# Patient Record
Sex: FEMALE | Race: WHITE | HISPANIC OR LATINO | Employment: OTHER | URBAN - METROPOLITAN AREA
[De-identification: names, ages, dates, MRNs, and addresses within clinical notes are randomized per-mention and may not be internally consistent; named-entity substitution may affect disease eponyms.]

---

## 2018-11-14 ENCOUNTER — TELEPHONE (OUTPATIENT)
Dept: CARDIOLOGY CLINIC | Facility: CLINIC | Age: 53
End: 2018-11-14

## 2018-11-14 NOTE — TELEPHONE ENCOUNTER
Called patient received referral via fax from Dr Rafy Taylor to Dr Loyda Garnica  Called patient home# and not working also called second # and no answer  Pt needs an appt with Dr Loyda Garnica pt id coming in for Chest pain, Acute Dermatitis

## 2018-11-16 ENCOUNTER — OFFICE VISIT (OUTPATIENT)
Dept: CARDIOLOGY CLINIC | Facility: CLINIC | Age: 53
End: 2018-11-16
Payer: COMMERCIAL

## 2018-11-16 VITALS
BODY MASS INDEX: 24.92 KG/M2 | DIASTOLIC BLOOD PRESSURE: 68 MMHG | SYSTOLIC BLOOD PRESSURE: 122 MMHG | OXYGEN SATURATION: 99 % | HEART RATE: 80 BPM | HEIGHT: 61 IN | WEIGHT: 132 LBS

## 2018-11-16 DIAGNOSIS — R07.9 CHEST PAIN, UNSPECIFIED TYPE: Primary | ICD-10-CM

## 2018-11-16 PROCEDURE — 93000 ELECTROCARDIOGRAM COMPLETE: CPT | Performed by: INTERNAL MEDICINE

## 2018-11-16 PROCEDURE — 99243 OFF/OP CNSLTJ NEW/EST LOW 30: CPT | Performed by: INTERNAL MEDICINE

## 2018-11-16 RX ORDER — GLUCOSAMINE/D3/BOSWELLIA SERRA 1500MG-400
TABLET ORAL
COMMUNITY

## 2018-11-16 RX ORDER — IBUPROFEN 800 MG/1
TABLET ORAL 3 TIMES DAILY
COMMUNITY
End: 2019-04-23 | Stop reason: ALTCHOICE

## 2018-11-16 RX ORDER — ALBUTEROL SULFATE 90 UG/1
AEROSOL, METERED RESPIRATORY (INHALATION) EVERY 6 HOURS PRN
COMMUNITY

## 2018-11-16 RX ORDER — MOMETASONE FUROATE 1 MG/G
OINTMENT TOPICAL
COMMUNITY
End: 2019-04-23 | Stop reason: ALTCHOICE

## 2018-11-16 RX ORDER — LANOLIN ALCOHOL/MO/W.PET/CERES
CREAM (GRAM) TOPICAL
COMMUNITY
End: 2021-08-13 | Stop reason: ALTCHOICE

## 2018-11-16 RX ORDER — FLUTICASONE PROPIONATE 50 MCG
SPRAY, SUSPENSION (ML) NASAL
COMMUNITY
Start: 2018-09-12 | End: 2019-08-20 | Stop reason: ALTCHOICE

## 2018-11-16 NOTE — PROGRESS NOTES
CARDIOLOGY OFFICE VISIT  [facility]  [address], [address] [place] [address], [facility] Abeba Hutchison  Tel: [phone]      NAME: Shiela Ganser  AGE: 46 y o  SEX: female  : 1965   MRN: [id number]      Chief Complaint:  Chief Complaint   Patient presents with    hospitals Care         History of Present Illness:   Ref by Dr Jose Thompson  60-year-old female with no significant past history who has been referred with a chief complaint of chest pain  Pain is in left lower chest, sharp in character  Lasts for only a few seconds  Worse with deep breathing and chest movement including lying down  No radiation  Not related to exertion  No associated nausea, diaphoresis, palpitations  Patient wants to exercise and is worried if she can because the chest pain  Pt denies SOB, palpitations, lightheadedness, syncope, swelling feet, orthopnea, PND, claudication  patient denies HTN, HLP, DM, smoking history   patient denies family history of premature CAD    Past Medical History:  History reviewed  No pertinent past medical history  Family History:   no history of premature CAD      Social History:  Social History     Social History    Marital status:       Spouse name: N/A    Number of children: N/A    Years of education: N/A     Social History Main Topics    Smoking status: Never Smoker    Smokeless tobacco: Never Used    Alcohol use No    Drug use: No    Sexual activity: Not Asked     Other Topics Concern    None     Social History Narrative    None       The following portions of the patient's history were reviewed and updated as appropriate: past medical history, past surgical history, past family history,  past social history, current medications, allergies list       Review of Systems:  Constitutional: Denies fever, chills, fatigue  Eyes: Denies eye redness, eye discharge, double vision  ENT: Denies hearing loss, tinnitus, sneezing, nasal discharge, sore throat   Respiratory: Denies cough, expectoration, hemoptysis, shortness of breath  Cardiovascular: Denies palpitations, orthopnea, PND, lower extremity swelling  Gastrointestinal: Denies abdominal pain, nausea, vomiting, hematemesis, diarrhea, bloody stools  Genito-Urinary: Denies dysuria, incontinence  Musculoskeletal: Denies back pain, joint pain, muscle pain  Neurologic: Denies confusion, lightheadedness, syncope, headache, focal weakness, sensory changes, seizures  Endocrine: Denies polyuria, polydipsia, temperature intolerance  Allergy and Immunology: Denies hives, insect bite sensitivity  Hematological and Lymphatic: Denies bleeding problems, swollen glands   Psychological: Denies depression, suicidal ideation, anxiety, panic  Dermatological: Denies pruritus, rash, skin lesion changes      Vitals:  Vitals:    11/16/18 1412   BP: 122/68   Pulse: 80   SpO2: 99%       Body mass index is 24 94 kg/m²  Weight (last 2 days)     Date/Time   Weight    11/16/18 1412  59 9 (132)                Physical Examination:  General: Patient is not in acute distress  Awake, alert, oriented in time, place and person  Responding to commands  Head: Normocephalic  Atraumatic  Eyes: Both pupils normal sized, round and reactive to light  Nonicteric  ENT: Normal external ear canals  Nares normal, no drainage  Lips and oral mucosa normal  Neck: Supple  JVP not raised  Trachea central  No thyromegaly  Lungs: Bilateral bronchovascular breath sounds with no crackles or rhonchi  Chest wall: No tenderness  Cardiovascular: RRR  S1 and S2 normal  No murmur, rub or gallop  Gastrointestinal: Abdomen soft, nontender  No guarding or rigidity  Liver and spleen not palpable  Bowel sounds present  Neurologic: Patient is awake, alert, oriented in time, place and person  Responding to command  Moving all extremities  Integumentary:  No skin rash  Lymphatic: No cervical lymphadenopathy  Back: Symmetric   No CVA tenderness  Extremities: No clubbing, cyanosis or edema      Laboratory Results:  EKG:  Sinus rhythm      Medications:    Current Outpatient Prescriptions:     albuterol (PROVENTIL HFA,VENTOLIN HFA) 90 mcg/act inhaler, ProAir HFA 90 mcg/actuation aerosol inhaler  Inhale 2 puffs every 4 hours by inhalation route for 30 days  , Disp: , Rfl:     Biotin 1 MG CAPS, biotin, Disp: , Rfl:     cyanocobalamin (VITAMIN B-12) 100 mcg tablet, Vitamin B12, Disp: , Rfl:     fluticasone (FLONASE) 50 mcg/act nasal spray, SPRAY 1 SPRAY INTO EACH NOSTRIL EVERY DAY, Disp: , Rfl:     ibuprofen (MOTRIN) 800 mg tablet, 3 (three) times a day, Disp: , Rfl:     mometasone (ELOCON) 0 1 % ointment, APPLY A THIN LAYER TO THE AFFECTED AREA(S) BY TOPICAL ROUTE on the Neck twice DAILY, Disp: , Rfl:       Allergies:  No Known Allergies      Assessment and Plan:  1  Chest pain, unspecified type   chest pain highly atypical to be cardiac in origin  However patient is asking if she can exercise and to what extent  Regular EKG stress test is ordered to answer the question    Recommend aggressive risk factor modification and therapeutic lifestyle changes  Low-salt, low-calorie, low-fat, low-cholesterol diet  I will defer the ordering and monitoring of necessity lab studies to you, but I am available and happy to review and manage any of the data at your request in the future  Discussed concepts of atherosclerosis, including signs and symptoms of cardiac disease  Previous studies were reviewed  Safety measures were reviewed  Questions were entertained and answered  Patient was advised to report any problems requiring medical attention  Follow-up with PCP and appropriate specialist and lab work as discussed  Return for follow up visit as scheduled or earlier, if needed  Thank you for allowing me to participate in the care and evaluation of your patient    Should you have any questions, please feel free to contact me       Castillo Stone MD  11/16/2018,5:29 PM

## 2018-11-20 ENCOUNTER — HOSPITAL ENCOUNTER (OUTPATIENT)
Dept: NON INVASIVE DIAGNOSTICS | Facility: CLINIC | Age: 53
Discharge: HOME | End: 2018-11-20
Payer: COMMERCIAL

## 2018-11-20 DIAGNOSIS — R07.9 CHEST PAIN, UNSPECIFIED TYPE: ICD-10-CM

## 2018-11-20 LAB
ARRHY DURING EX: NORMAL
CHEST PAIN STATEMENT: NORMAL
MAX DIASTOLIC BP: 60 MMHG
MAX HEART RATE: 157 BPM
MAX PREDICTED HEART RATE: 166 BPM
MAX. SYSTOLIC BP: 152 MMHG
PROTOCOL NAME: NORMAL
REASON FOR TERMINATION: NORMAL
TARGET HR FORMULA: NORMAL
TEST INDICATION: NORMAL
TIME IN EXERCISE PHASE: NORMAL

## 2018-11-20 PROCEDURE — 93017 CV STRESS TEST TRACING ONLY: CPT

## 2018-11-21 PROCEDURE — 93016 CV STRESS TEST SUPVJ ONLY: CPT | Performed by: INTERNAL MEDICINE

## 2018-11-21 PROCEDURE — 93018 CV STRESS TEST I&R ONLY: CPT | Performed by: INTERNAL MEDICINE

## 2019-03-18 ENCOUNTER — TELEPHONE (OUTPATIENT)
Dept: NEUROLOGY | Facility: CLINIC | Age: 54
End: 2019-03-18

## 2019-04-23 ENCOUNTER — CONSULT (OUTPATIENT)
Dept: NEUROLOGY | Facility: CLINIC | Age: 54
End: 2019-04-23
Payer: COMMERCIAL

## 2019-04-23 VITALS
SYSTOLIC BLOOD PRESSURE: 104 MMHG | HEIGHT: 61 IN | DIASTOLIC BLOOD PRESSURE: 66 MMHG | WEIGHT: 137 LBS | BODY MASS INDEX: 25.86 KG/M2 | HEART RATE: 70 BPM

## 2019-04-23 DIAGNOSIS — G43.711 INTRACTABLE CHRONIC MIGRAINE WITHOUT AURA AND WITH STATUS MIGRAINOSUS: Primary | ICD-10-CM

## 2019-04-23 DIAGNOSIS — J01.30 SUBACUTE SPHENOIDAL SINUSITIS: ICD-10-CM

## 2019-04-23 PROCEDURE — 99244 OFF/OP CNSLTJ NEW/EST MOD 40: CPT | Performed by: PSYCHIATRY & NEUROLOGY

## 2019-04-23 RX ORDER — TOPIRAMATE 25 MG/1
25 TABLET ORAL
Qty: 30 TABLET | Refills: 2 | Status: SHIPPED | OUTPATIENT
Start: 2019-04-23 | End: 2019-07-26 | Stop reason: SDUPTHER

## 2019-04-23 RX ORDER — OMEPRAZOLE MAGNESIUM 20 MG
3 CAPSULE,DELAYED RELEASE (ENTERIC COATED) ORAL
Refills: 4 | COMMUNITY
Start: 2019-03-18 | End: 2019-08-20 | Stop reason: ALTCHOICE

## 2019-04-23 RX ORDER — MULTIVIT WITH MINERALS/LUTEIN
1000 TABLET ORAL DAILY
COMMUNITY

## 2019-04-23 RX ORDER — SUMATRIPTAN 100 MG/1
100 TABLET, FILM COATED ORAL ONCE AS NEEDED
Qty: 9 TABLET | Refills: 2 | Status: SHIPPED | OUTPATIENT
Start: 2019-04-23 | End: 2019-07-02 | Stop reason: SDUPTHER

## 2019-06-07 ENCOUNTER — HOSPITAL ENCOUNTER (OUTPATIENT)
Dept: MRI IMAGING | Facility: CLINIC | Age: 54
Discharge: HOME | End: 2019-06-07
Payer: COMMERCIAL

## 2019-06-07 DIAGNOSIS — J01.30 SUBACUTE SPHENOIDAL SINUSITIS: ICD-10-CM

## 2019-06-07 DIAGNOSIS — G43.711 INTRACTABLE CHRONIC MIGRAINE WITHOUT AURA AND WITH STATUS MIGRAINOSUS: ICD-10-CM

## 2019-06-07 PROCEDURE — 70551 MRI BRAIN STEM W/O DYE: CPT

## 2019-07-02 DIAGNOSIS — G43.711 INTRACTABLE CHRONIC MIGRAINE WITHOUT AURA AND WITH STATUS MIGRAINOSUS: ICD-10-CM

## 2019-07-02 RX ORDER — SUMATRIPTAN 100 MG/1
100 TABLET, FILM COATED ORAL ONCE AS NEEDED
Qty: 9 TABLET | Refills: 2 | Status: SHIPPED | OUTPATIENT
Start: 2019-07-02 | End: 2019-08-20 | Stop reason: SDUPTHER

## 2019-07-02 NOTE — TELEPHONE ENCOUNTER
Pt calling in regarding MRI results  Made pt aware that impression  Pt asking why she is still getting migraines  Pt in need for sumatriptan refill pending for you approval     Pt asking for a call from you      [phone]

## 2019-07-05 ENCOUNTER — TELEPHONE (OUTPATIENT)
Dept: NEUROLOGY | Facility: CLINIC | Age: 54
End: 2019-07-05

## 2019-07-26 DIAGNOSIS — G43.711 INTRACTABLE CHRONIC MIGRAINE WITHOUT AURA AND WITH STATUS MIGRAINOSUS: ICD-10-CM

## 2019-07-26 RX ORDER — TOPIRAMATE 25 MG/1
25 TABLET ORAL
Qty: 30 TABLET | Refills: 2 | Status: SHIPPED | OUTPATIENT
Start: 2019-07-26 | End: 2019-08-20 | Stop reason: SDUPTHER

## 2019-08-20 ENCOUNTER — OFFICE VISIT (OUTPATIENT)
Dept: NEUROLOGY | Facility: CLINIC | Age: 54
End: 2019-08-20
Payer: COMMERCIAL

## 2019-08-20 VITALS
SYSTOLIC BLOOD PRESSURE: 100 MMHG | DIASTOLIC BLOOD PRESSURE: 70 MMHG | WEIGHT: 135 LBS | HEIGHT: 61 IN | HEART RATE: 72 BPM | BODY MASS INDEX: 25.49 KG/M2

## 2019-08-20 DIAGNOSIS — G43.711 INTRACTABLE CHRONIC MIGRAINE WITHOUT AURA AND WITH STATUS MIGRAINOSUS: ICD-10-CM

## 2019-08-20 PROCEDURE — 99213 OFFICE O/P EST LOW 20 MIN: CPT | Performed by: PSYCHIATRY & NEUROLOGY

## 2019-08-20 RX ORDER — SUMATRIPTAN 100 MG/1
100 TABLET, FILM COATED ORAL ONCE AS NEEDED
Qty: 9 TABLET | Refills: 4 | Status: SHIPPED | OUTPATIENT
Start: 2019-08-20 | End: 2020-02-20 | Stop reason: SDUPTHER

## 2019-08-20 RX ORDER — TOPIRAMATE 25 MG/1
25 TABLET ORAL
Qty: 30 TABLET | Refills: 4 | Status: SHIPPED | OUTPATIENT
Start: 2019-08-20 | End: 2020-02-20 | Stop reason: SDUPTHER

## 2019-08-20 NOTE — PROGRESS NOTES
Progress Note - Neurology   Abeba Palomino 53 y o  female MRN: [id number]  Unit/Bed#:  Encounter: [id number]      Subjective:   Patient is here for a follow-up visit with a history of chronic migraine headaches, and since her last visit had an MRI of the brain which was unremarkable except for thickening of the mucosal sinuses and she saw relief with the help of topiramate and sumatriptan but decided to discontinue the topiramate since she was doing well and unfortunately started getting migraines again  For the last 2 weeks she has been on medications in the form of topiramate 25 mg at bedtime and sumatriptan on a p r n  Basis which also gives her prompt relief  Patient denies any other neurological symptoms  Her MRI results were discussed with her at length  ROS:   Review of Systems   Constitutional: Negative  Negative for appetite change and fever  HENT: Negative  Negative for hearing loss, tinnitus, trouble swallowing and voice change  Eyes: Positive for photophobia  Negative for pain  Respiratory: Negative  Negative for shortness of breath  Cardiovascular: Negative  Negative for palpitations  Gastrointestinal: Negative  Negative for abdominal pain, nausea and vomiting  Endocrine: Negative  Negative for cold intolerance and heat intolerance  Genitourinary: Negative  Negative for dysuria, frequency and urgency  Musculoskeletal: Positive for back pain and neck pain  Negative for myalgias  Muscle cramping of left lower leg and foot   Skin: Negative  Negative for rash  Neurological: Positive for headaches  Negative for dizziness, tremors, seizures, syncope, facial asymmetry, speech difficulty, weakness, light-headedness and numbness  Hematological: Negative  Does not bruise/bleed easily  Psychiatric/Behavioral: Positive for sleep disturbance  Negative for confusion and hallucinations         Vitals:   Vitals:    08/20/19 1121   BP: 100/70   BP Location: Left arm   Patient Position: Sitting   Cuff Size: Adult   Pulse: 72   Weight: 61 2 kg (135 lb)   Height: 5' 1" (1 549 m)   ,Body mass index is 25 51 kg/m²  MEDS:      Current Outpatient Medications:     albuterol (PROVENTIL HFA,VENTOLIN HFA) 90 mcg/act inhaler, every 6 (six) hours as needed , Disp: , Rfl:     Ascorbic Acid (VITAMIN C) 1000 MG tablet, Take 1,000 mg by mouth daily, Disp: , Rfl:     Biotin 90046 MCG TABS, biotin, Disp: , Rfl:     COLLAGEN PO, Take 10,000 mg by mouth, Disp: , Rfl:     cyanocobalamin (VITAMIN B-12) 1,000 mcg tablet, Vitamin B12, Disp: , Rfl:     SUMAtriptan (IMITREX) 100 mg tablet, Take 1 tablet (100 mg total) by mouth once as needed for migraine for up to 1 dose Max 3 doses per week, Disp: 9 tablet, Rfl: 2    topiramate (TOPAMAX) 25 mg tablet, Take 1 tablet (25 mg total) by mouth daily at bedtime, Disp: 30 tablet, Rfl: 2  :    Physical Exam:  General appearance: alert, appears stated age and cooperative  Head: Normocephalic, without obvious abnormality, atraumatic      On examination she has no evidence of any cranial nerve, motor or sensory deficits in the upper or lower extremities, no evidence of any dysmetria, her gait is normal based    Lab Results: I have personally reviewed pertinent reports  Imaging Studies: I have personally reviewed pertinent reports  Assessment:  1  Chronic migraine headaches  Plan:  Patient was familiarised with migraine triggers again, she is encouraged to continue present medications, denies any side effects from the medications and will return back to see me in 6 months       8/20/2019,11:33 AM    Dictation voice to text software has been used in the creation of this document  Please consider this in light of any contextual or grammatical errors

## 2020-02-20 ENCOUNTER — OFFICE VISIT (OUTPATIENT)
Dept: NEUROLOGY | Facility: CLINIC | Age: 55
End: 2020-02-20
Payer: COMMERCIAL

## 2020-02-20 VITALS
BODY MASS INDEX: 24.55 KG/M2 | SYSTOLIC BLOOD PRESSURE: 94 MMHG | WEIGHT: 130 LBS | HEART RATE: 75 BPM | DIASTOLIC BLOOD PRESSURE: 68 MMHG | HEIGHT: 61 IN

## 2020-02-20 DIAGNOSIS — G43.711 INTRACTABLE CHRONIC MIGRAINE WITHOUT AURA AND WITH STATUS MIGRAINOSUS: ICD-10-CM

## 2020-02-20 DIAGNOSIS — G43.709 CHRONIC MIGRAINE WITHOUT AURA WITHOUT STATUS MIGRAINOSUS, NOT INTRACTABLE: Primary | ICD-10-CM

## 2020-02-20 PROCEDURE — 99213 OFFICE O/P EST LOW 20 MIN: CPT | Performed by: PSYCHIATRY & NEUROLOGY

## 2020-02-20 RX ORDER — FERROUS SULFATE 325(65) MG
975 TABLET ORAL EVERY OTHER DAY
COMMUNITY
End: 2021-08-13 | Stop reason: ALTCHOICE

## 2020-02-20 RX ORDER — TOPIRAMATE 25 MG/1
25 TABLET ORAL
Qty: 30 TABLET | Refills: 6 | Status: SHIPPED | OUTPATIENT
Start: 2020-02-20 | End: 2020-12-18

## 2020-02-20 RX ORDER — SUMATRIPTAN 100 MG/1
100 TABLET, FILM COATED ORAL ONCE AS NEEDED
Qty: 9 TABLET | Refills: 6 | Status: SHIPPED | OUTPATIENT
Start: 2020-02-20 | End: 2020-07-21

## 2020-02-20 NOTE — PROGRESS NOTES
Progress Note - Neurology   Gil Satnos 53 y o  female MRN: [id number]  Unit/Bed#:  Encounter: [id number]      Subjective:   Patient is here for a follow-up visit with a history of chronic migraine headaches, and since her last visit she continues to experience occasional headaches which can last for 3 days in a row generally relieved with sumatriptan  She remains on topiramate 25 milligrams at bedtime which has helped significantly and denies any side effects  Overall has been feeling much better  ROS:   Review of Systems   Constitutional: Negative  Negative for appetite change and fever  HENT: Negative  Negative for hearing loss, tinnitus, trouble swallowing and voice change  Eyes: Positive for pain  Negative for photophobia  Respiratory: Negative  Negative for shortness of breath  Cardiovascular: Negative  Negative for palpitations  Gastrointestinal: Negative  Negative for nausea and vomiting  Endocrine: Negative  Negative for cold intolerance and heat intolerance  Genitourinary: Negative  Negative for dysuria, frequency and urgency  Musculoskeletal: Negative  Negative for myalgias and neck pain  Skin: Negative  Negative for rash  Neurological: Positive for headaches  Negative for dizziness, tremors, seizures, syncope, facial asymmetry, speech difficulty, weakness, light-headedness and numbness  Hematological: Negative  Does not bruise/bleed easily  Psychiatric/Behavioral: Positive for sleep disturbance  Negative for confusion and hallucinations  Vitals:   Vitals:    02/20/20 0953   BP: 94/68   BP Location: Left arm   Patient Position: Sitting   Cuff Size: Adult   Pulse: 75   Weight: 59 kg (130 lb)   Height: 5' 1" (1 549 m)   ,Body mass index is 24 56 kg/m²      MEDS:      Current Outpatient Medications:     albuterol (PROVENTIL HFA,VENTOLIN HFA) 90 mcg/act inhaler, every 6 (six) hours as needed , Disp: , Rfl:     Ascorbic Acid (VITAMIN C) 1000 MG tablet, Take 1,000 mg by mouth daily, Disp: , Rfl:     Biotin 62864 MCG TABS, biotin, Disp: , Rfl:     Calcium Carbonate-Vitamin D (Calcium 500 + D) 500-125 MG-UNIT TABS, Take by mouth, Disp: , Rfl:     COLLAGEN PO, Take 10,000 mg by mouth, Disp: , Rfl:     cyanocobalamin (VITAMIN B-12) 1,000 mcg tablet, Vitamin B12, Disp: , Rfl:     ferrous sulfate 325 (65 Fe) mg tablet, Take 975 mg by mouth every other day, Disp: , Rfl:     MAGNESIUM CITRATE PO, Take by mouth, Disp: , Rfl:     SUMAtriptan (IMITREX) 100 mg tablet, Take 1 tablet (100 mg total) by mouth once as needed for migraine for up to 1 dose Max 3 doses per week, Disp: 9 tablet, Rfl: 4    topiramate (TOPAMAX) 25 mg tablet, Take 1 tablet (25 mg total) by mouth daily at bedtime, Disp: 30 tablet, Rfl: 4  :    Physical Exam:  General appearance: alert, appears stated age and cooperative  Head: Normocephalic, without obvious abnormality, atraumatic    On examination patient is alert awake oriented, speech is fluent, cranial nerves 2-12 intact, no new deficits were noted on motor and sensory exam   No evidence of any dysmetria and her gait is normal based  Lab Results: I have personally reviewed pertinent reports  Imaging Studies: I have personally reviewed pertinent reports  Assessment:  1  Chronic migraine headaches  Plan:  Patient is advised to continue topiramate 25 milligrams at bedtime, sumatriptan 100 milligrams on a p r n  Basis, to be taken at the onset of the headache, and will return back to see me in 6 months       2/20/2020,9:59 AM    Dictation voice to text software has been used in the creation of this document  Please consider this in light of any contextual or grammatical errors

## 2020-07-20 DIAGNOSIS — G43.711 INTRACTABLE CHRONIC MIGRAINE WITHOUT AURA AND WITH STATUS MIGRAINOSUS: ICD-10-CM

## 2020-07-21 RX ORDER — SUMATRIPTAN 100 MG/1
100 TABLET, FILM COATED ORAL ONCE AS NEEDED
Qty: 9 TABLET | Refills: 2 | Status: SHIPPED | OUTPATIENT
Start: 2020-07-21 | End: 2021-04-01 | Stop reason: SDUPTHER

## 2020-08-21 ENCOUNTER — TRANSCRIBE ORDERS (OUTPATIENT)
Dept: ADMINISTRATIVE | Facility: HOSPITAL | Age: 55
End: 2020-08-21

## 2020-08-21 DIAGNOSIS — R79.89 HYPOURICEMIA: Primary | ICD-10-CM

## 2020-08-27 ENCOUNTER — HOSPITAL ENCOUNTER (OUTPATIENT)
Dept: ULTRASOUND IMAGING | Facility: CLINIC | Age: 55
Discharge: HOME | End: 2020-08-27
Payer: COMMERCIAL

## 2020-08-27 DIAGNOSIS — R79.89 HYPOURICEMIA: ICD-10-CM

## 2020-08-27 PROCEDURE — 76536 US EXAM OF HEAD AND NECK: CPT

## 2020-12-18 DIAGNOSIS — G43.711 INTRACTABLE CHRONIC MIGRAINE WITHOUT AURA AND WITH STATUS MIGRAINOSUS: ICD-10-CM

## 2020-12-18 RX ORDER — TOPIRAMATE 25 MG/1
TABLET ORAL
Qty: 30 TABLET | Refills: 6 | Status: SHIPPED | OUTPATIENT
Start: 2020-12-18 | End: 2021-07-28

## 2021-04-01 DIAGNOSIS — G43.711 INTRACTABLE CHRONIC MIGRAINE WITHOUT AURA AND WITH STATUS MIGRAINOSUS: ICD-10-CM

## 2021-04-01 RX ORDER — SUMATRIPTAN 100 MG/1
100 TABLET, FILM COATED ORAL ONCE AS NEEDED
Qty: 9 TABLET | Refills: 2 | Status: SHIPPED | OUTPATIENT
Start: 2021-04-01 | End: 2021-07-06

## 2021-07-03 DIAGNOSIS — G43.711 INTRACTABLE CHRONIC MIGRAINE WITHOUT AURA AND WITH STATUS MIGRAINOSUS: ICD-10-CM

## 2021-07-06 RX ORDER — SUMATRIPTAN 100 MG/1
TABLET, FILM COATED ORAL
Qty: 9 TABLET | Refills: 2 | Status: SHIPPED | OUTPATIENT
Start: 2021-07-06 | End: 2021-08-13 | Stop reason: SDUPTHER

## 2021-07-28 DIAGNOSIS — G43.711 INTRACTABLE CHRONIC MIGRAINE WITHOUT AURA AND WITH STATUS MIGRAINOSUS: ICD-10-CM

## 2021-07-28 RX ORDER — TOPIRAMATE 25 MG/1
TABLET ORAL
Qty: 30 TABLET | Refills: 6 | Status: SHIPPED | OUTPATIENT
Start: 2021-07-28 | End: 2021-08-13 | Stop reason: SDUPTHER

## 2021-08-13 ENCOUNTER — OFFICE VISIT (OUTPATIENT)
Dept: NEUROLOGY | Facility: CLINIC | Age: 56
End: 2021-08-13
Payer: COMMERCIAL

## 2021-08-13 VITALS
HEART RATE: 72 BPM | HEIGHT: 61 IN | DIASTOLIC BLOOD PRESSURE: 72 MMHG | TEMPERATURE: 98.2 F | BODY MASS INDEX: 25.68 KG/M2 | WEIGHT: 136 LBS | SYSTOLIC BLOOD PRESSURE: 98 MMHG | RESPIRATION RATE: 16 BRPM

## 2021-08-13 DIAGNOSIS — G43.711 INTRACTABLE CHRONIC MIGRAINE WITHOUT AURA AND WITH STATUS MIGRAINOSUS: ICD-10-CM

## 2021-08-13 PROCEDURE — 99214 OFFICE O/P EST MOD 30 MIN: CPT | Performed by: PSYCHIATRY & NEUROLOGY

## 2021-08-13 RX ORDER — TOPIRAMATE 25 MG/1
50 TABLET ORAL
Qty: 60 TABLET | Refills: 6 | Status: SHIPPED | OUTPATIENT
Start: 2021-08-13 | End: 2022-06-03

## 2021-08-13 RX ORDER — DIPHENOXYLATE HYDROCHLORIDE AND ATROPINE SULFATE 2.5; .025 MG/1; MG/1
1 TABLET ORAL DAILY
COMMUNITY

## 2021-08-13 RX ORDER — SUMATRIPTAN 100 MG/1
100 TABLET, FILM COATED ORAL ONCE AS NEEDED
Qty: 9 TABLET | Refills: 6 | Status: SHIPPED | OUTPATIENT
Start: 2021-08-13 | End: 2022-06-14

## 2021-08-13 NOTE — PROGRESS NOTES
Progress Note - Neurology   Alexsh[place] 61 y o  female MRN: [id number]  Unit/Bed#:  Encounter: [id number]      Subjective:   Patient is here for a follow-up visit and was last seen by me over a year ago for chronic migraine headaches  Patient remains on topiramate 25 mg at bedtime and sumatriptan on a p r n  basis and reports somewhat worsening frequency of headaches in the recent past which occur once or twice in a month and last for 3-4 days  She generally gets prompt relief with the help of sumatriptan if she takes it early but has to repeat the sumatriptan the next day  Patient does not recall any triggers that could be worsening her headaches and denies any other neurological symptoms associated with the same  She was recently worked up by Endocrinology for possible hypothyroidism  There has been no other significant changes in her health since she last saw me    ROS:   Review of Systems   Constitutional: Negative for appetite change, fatigue and fever  HENT: Negative  Negative for hearing loss, tinnitus, trouble swallowing and voice change  Eyes: Negative  Negative for photophobia and pain  Respiratory: Negative  Negative for shortness of breath  Cardiovascular: Negative  Negative for palpitations  Gastrointestinal: Negative  Negative for nausea and vomiting  Endocrine: Negative  Negative for cold intolerance  Genitourinary: Negative  Negative for dysuria, frequency and urgency  Musculoskeletal: Negative  Negative for myalgias and neck pain  Skin: Negative  Negative for rash  Neurological: Positive for headaches (1-2 a month, lasting 3-4 days at a time)  Negative for dizziness, tremors, seizures, syncope, facial asymmetry, speech difficulty, weakness, light-headedness and numbness  Hematological: Negative  Does not bruise/bleed easily  Psychiatric/Behavioral: Positive for sleep disturbance  Negative for confusion and hallucinations      MA review of systems was reviewed by myself  Vitals:   Vitals:    08/13/21 0917   BP: 98/72   BP Location: Left arm   Patient Position: Sitting   Cuff Size: Standard   Pulse: 72   Resp: 16   Temp: 98 2 °F (36 8 °C)   TempSrc: Temporal   Weight: 61 7 kg (136 lb)   Height: 5' 1" (1 549 m)   ,Body mass index is 25 7 kg/m²  MEDS:      Current Outpatient Medications:     albuterol (PROVENTIL HFA,VENTOLIN HFA) 90 mcg/act inhaler, every 6 (six) hours as needed , Disp: , Rfl:     Ascorbic Acid (VITAMIN C) 1000 MG tablet, Take 1,000 mg by mouth daily, Disp: , Rfl:     Biotin 77202 MCG TABS, biotin, Disp: , Rfl:     COLLAGEN PO, Take 10,000 mg by mouth, Disp: , Rfl:     MAGNESIUM CITRATE PO, Take by mouth, Disp: , Rfl:     multivitamin (THERAGRAN) TABS, Take 1 tablet by mouth daily, Disp: , Rfl:     SUMAtriptan (IMITREX) 100 mg tablet, TAKE 1 TABLET BY MOUTH ONCE AS NEEDED FOR MIGRAINE FOR UP TO 1 DOSE MAX 3 DOSES PER WEEK, Disp: 9 tablet, Rfl: 2    topiramate (TOPAMAX) 25 mg tablet, TAKE 1 TABLET BY MOUTH DAILY AT BEDTIME, Disp: 30 tablet, Rfl: 6  :    Physical Exam:  General appearance: alert, appears stated age and cooperative  Head: Normocephalic, without obvious abnormality, atraumatic      On neurological examination patient is alert awake oriented, speech is fluent, cranial nerves 2-12 intact, and on motor and sensory exam there is no evidence of any drift or focal weakness, no sensory loss was noted, no evidence of any dysmetria and her gait is normal based  Lab Results: I have personally reviewed pertinent reports  Imaging Studies: I have personally reviewed pertinent reports  Assessment:  1  Chronic migraine headaches with recent worsening  Plan:     Patient is advised at this time to increase the dose of topiramate to 50 mg at bedtime, continue sumatriptan on a p r n  basis, she was made familiar with dietary triggers and other migraine triggers, and patient will call us if she notices no improvement    She will return back to see me in 6 months  Counseling / Coordination of Care  Total time spent today 25 minutes  Greater than 50% of total time was spent with the patient and / or family counseling and / or coordination of care  8/13/2021,9:19 AM    Dictation voice to text software has been used in the creation of this document  Please consider this in light of any contextual or grammatical errors

## 2022-04-29 ENCOUNTER — TELEPHONE (OUTPATIENT)
Dept: NEUROLOGY | Facility: CLINIC | Age: 57
End: 2022-04-29

## 2022-05-09 NOTE — TELEPHONE ENCOUNTER
Called patient lmom for her to call back to confirm appointment for 9/6/22 at 230 pm, with Dr Jessica Briseno   Left call back number [phone]

## 2022-05-09 NOTE — TELEPHONE ENCOUNTER
Called patient and leave a message to contact office to reschedule appointment from 04/29/22 with Jay Puga

## 2022-06-02 DIAGNOSIS — G43.711 INTRACTABLE CHRONIC MIGRAINE WITHOUT AURA AND WITH STATUS MIGRAINOSUS: ICD-10-CM

## 2022-06-03 RX ORDER — TOPIRAMATE 25 MG/1
50 TABLET ORAL
Qty: 60 TABLET | Refills: 6 | Status: SHIPPED | OUTPATIENT
Start: 2022-06-03

## 2022-08-31 ENCOUNTER — TELEPHONE (OUTPATIENT)
Dept: NEUROLOGY | Facility: CLINIC | Age: 57
End: 2022-08-31

## 2022-09-06 ENCOUNTER — OFFICE VISIT (OUTPATIENT)
Dept: NEUROLOGY | Facility: CLINIC | Age: 57
End: 2022-09-06
Payer: COMMERCIAL

## 2022-09-06 VITALS
BODY MASS INDEX: 25.7 KG/M2 | TEMPERATURE: 98 F | SYSTOLIC BLOOD PRESSURE: 110 MMHG | OXYGEN SATURATION: 98 % | DIASTOLIC BLOOD PRESSURE: 68 MMHG | HEART RATE: 70 BPM | HEIGHT: 61 IN

## 2022-09-06 DIAGNOSIS — G43.719 INTRACTABLE CHRONIC MIGRAINE WITHOUT AURA AND WITHOUT STATUS MIGRAINOSUS: Primary | ICD-10-CM

## 2022-09-06 DIAGNOSIS — G43.711 INTRACTABLE CHRONIC MIGRAINE WITHOUT AURA AND WITH STATUS MIGRAINOSUS: ICD-10-CM

## 2022-09-06 PROCEDURE — 99215 OFFICE O/P EST HI 40 MIN: CPT | Performed by: PSYCHIATRY & NEUROLOGY

## 2022-09-06 RX ORDER — MAGNESIUM OXIDE 400 MG/1
400 TABLET ORAL DAILY
COMMUNITY

## 2022-09-06 RX ORDER — GABAPENTIN 100 MG/1
100 CAPSULE ORAL
Qty: 30 CAPSULE | Refills: 5 | Status: SHIPPED | OUTPATIENT
Start: 2022-09-06

## 2022-09-06 RX ORDER — SUMATRIPTAN 100 MG/1
100 TABLET, FILM COATED ORAL ONCE AS NEEDED
Qty: 9 TABLET | Refills: 3 | Status: SHIPPED | OUTPATIENT
Start: 2022-09-06

## 2022-09-06 NOTE — PROGRESS NOTES
Manjeet Stone is a 61 y o  female  Chief Complaint   Patient presents with    Intractable chronic migraine without aura and with status m       Assessment:  1  Intractable chronic migraine without aura and without status migrainosus        Plan:  Neurontin 100 mg at nighttime  Continue with Imitrex 100 mg p r n  Avoid migraine triggers  Follow-up in 6 months  Discussion:  Differential diagnosis discussed with the patient, patient with history of chronic migraine headaches, she has had an MRI scan of the brain in the past that was normal since she has stopped using the Topamax her headaches are slightly worse but patient did not find much relief with Topamax I discussed with her different prophylactic medications she is agreeable to go on gabapentin 100 mg at nighttime to see if it will help with the headaches side effects discussed with her if in case she is tolerating it well then we can always increase the dose, if she has any side effects then we can stop the medication, she was advised to avoid migraine triggers which we discussed in detail including foods to avoid  She was advised to keep herself well hydrated, to keep her blood pressure cholesterol and sugar under control  She will continue with Imitrex 100 mg p r n  once a day can repeat it after 2 hours if needed maximum twice a week, patient denies having any cardiac issues and has tolerated the medication well in the past, to go to the hospital if has any worsening symptoms and call me otherwise to see me back in 6 months and follow up with her  other physicians          Subjective:    HPI   Patient is here in follow-up for history of chronic headaches, she has seen my associate Dr Ángel Barajas in the past and since the last visit she feels that she has been getting more headaches at least twice a week they are mostly in the front of the head and then radiate to the top of the head going to the back associated with photophobia phonophobia about 8 on 10 sometimes is associated with nausea it could last for a couple of hours and sometimes more she was on Topamax but did not feel that it was helping her much with the headaches and hence she stopped using the Topamax, she has slight difficulty in going sleep, appetite is good weight has been stable no vision difficulties no other complaints  Vitals:    09/06/22 1414   BP: 110/68   BP Location: Right arm   Patient Position: Sitting   Cuff Size: Adult   Pulse: 70   Temp: 98 °F (36 7 °C)   TempSrc: Probe   SpO2: 98%   Height: 5' 1" (1 549 m)       Current Medications    Current Outpatient Medications:     albuterol (PROVENTIL HFA,VENTOLIN HFA) 90 mcg/act inhaler, every 6 (six) hours as needed , Disp: , Rfl:     Ascorbic Acid (VITAMIN C) 1000 MG tablet, Take 1,000 mg by mouth daily, Disp: , Rfl:     Biotin 87910 MCG TABS, biotin, Disp: , Rfl:     Cholecalciferol 25 MCG (1000 UT) tablet, Take 1,000 Units by mouth daily, Disp: , Rfl:     COLLAGEN PO, Take 10,000 mg by mouth, Disp: , Rfl:     MAGNESIUM CITRATE PO, Take by mouth, Disp: , Rfl:     magnesium oxide (MAG-OX) 400 mg tablet, Take 400 mg by mouth daily, Disp: , Rfl:     milk thistle 175 MG tablet, Take 175 mg by mouth daily, Disp: , Rfl:     SUMAtriptan (IMITREX) 100 mg tablet, Take 1 tablet (100 mg total) by mouth once as needed for migraine, Disp: 9 tablet, Rfl: 3    multivitamin (THERAGRAN) TABS, Take 1 tablet by mouth daily (Patient not taking: Reported on 9/6/2022), Disp: , Rfl:     topiramate (TOPAMAX) 25 mg tablet, TAKE 2 TABLETS (50 MG TOTAL) BY MOUTH DAILY AT BEDTIME (Patient not taking: Reported on 9/6/2022), Disp: 60 tablet, Rfl: 6      Allergies  Patient has no known allergies  Past Medical History  Past Medical History:   Diagnosis Date    Anemia     Head injury     about age 14 ?     Headache, tension-type          Past Surgical History:  Past Surgical History:   Procedure Laterality Date    NO PAST SURGERIES           Family History:  Family History   Problem Relation Age of Onset    No Known Problems Mother     Hypertension Father     Sudden death Father     Hypertension Brother     Hypertension Brother     Cancer Maternal Uncle        Social History:   reports that she has never smoked  She has never used smokeless tobacco  She reports that she does not drink alcohol and does not use drugs  I have reviewed the past medical history, surgical history, social and family history, current medications, allergies vitals, review of systems, and updated this information as appropriate today  Objective:    Physical Exam    Neurological Exam      GENERAL:  Cooperative in no acute distress  Well-developed and well-nourished    HEAD and NECK   Head is atraumatic normocephalic with no lesions or masses  Neck is supple with full range of motion    CARDIOVASCULAR  Carotid Arteries-no carotid bruits  NEUROLOGIC:  Mental Status-the patient is awake alert and oriented without aphasia or apraxia  Cranial Nerves: Visual fields are full to confrontation  Visual acuity is 20/20 with hand-held chart Extraocular movements are full without nystagmus  Pupils are 2-1/2 mm and reactive  Face is symmetrical to light touch  Movements of facial expression move symmetrically  Hearing is normal to finger rub bilaterally  Soft palate lifts symmetrically  Shoulder shrug is symmetrical  Tongue is midline without atrophy  Motor: No drift is noted on arm extension  Strength is full in the upper and lower extremities with normal bulk and tone  Sensory: Intact to temperature and vibratory sensation in the upper and lower extremities bilaterally  Cortical function is intact  Coordination: Finger to nose testing is performed accurately  Romberg is negative  Gait reveals a normal base with symmetrical arm swing  Tandem walk is normal   Reflexes:      2+ and symmetrical Toes are downgoing  No temporal artery tenderness no meningeal signs          ROS:  Review of Systems   Constitutional: Negative  Negative for appetite change and fever  HENT: Negative  Negative for hearing loss, tinnitus, trouble swallowing and voice change  Eyes: Negative  Negative for photophobia and pain  Respiratory: Negative  Negative for shortness of breath  Cardiovascular: Negative  Negative for palpitations  Gastrointestinal: Negative  Negative for nausea and vomiting  Endocrine: Negative  Negative for cold intolerance  Genitourinary: Negative  Negative for dysuria, frequency and urgency  Musculoskeletal: Negative  Negative for myalgias and neck pain  Skin: Negative  Negative for rash  Neurological: Positive for headaches  Negative for dizziness, tremors, seizures, syncope, facial asymmetry, speech difficulty, weakness, light-headedness and numbness  Hematological: Bruises/bleeds easily  Psychiatric/Behavioral: Positive for sleep disturbance  Negative for confusion and hallucinations

## 2022-10-26 ENCOUNTER — PREP FOR PROCEDURE (OUTPATIENT)
Dept: GASTROENTEROLOGY | Facility: CLINIC | Age: 57
End: 2022-10-26

## 2022-10-26 ENCOUNTER — TELEPHONE (OUTPATIENT)
Dept: GASTROENTEROLOGY | Facility: CLINIC | Age: 57
End: 2022-10-26

## 2022-10-26 DIAGNOSIS — Z12.11 SCREENING FOR COLON CANCER: Primary | ICD-10-CM

## 2022-10-26 NOTE — TELEPHONE ENCOUNTER
10/26/22  Screened by: Jey Traylor    Referring Provider Dr Tomasz Dorsey    Pre- Screening: There is no height or weight on file to calculate BMI  Has patient been referred for a routine screening Colonoscopy? yes  Is the patient between 39-70 years old? yes      Previous Colonoscopy no   If yes:    Date:     Facility:     Reason:       SCHEDULING STAFF: If the patient is between 45yrs-49yrs, please advise patient to confirm benefits/coverage with their insurance company for a routine screening colonoscopy, some insurance carriers will only cover at [address] or older  If the patient is over 66years old, please schedule an office visit  Does the patient want to see a Gastroenterologist prior to their procedure OR are they having any GI symptoms? no    Has the patient been hospitalized or had abdominal surgery in the past 6 months? no    Does the patient use supplemental oxygen? no    Does the patient take Coumadin, Lovenox, Plavix, Elliquis, Xarelto, or other blood thinning medication? no    Has the patient had a stroke, cardiac event, or stent placed in the past year? no    SCHEDULING STAFF: If patient answers NO to above questions, then schedule procedure  If patient answers YES to above questions, then schedule office appointment  If patient is between 45yrs - 49yrs, please advise patient that we will have to confirm benefits & coverage with their insurance company for a routine screening colonoscopy

## 2022-10-26 NOTE — TELEPHONE ENCOUNTER
Patients GI provider:  Getachew OA    Number to return call: ([phone])    Reason for call: Pt calling to schedule her colonoscopy in the [place] area  Thank you!     Scheduled procedure/appointment date if applicable: Apt/procedure

## 2022-10-26 NOTE — TELEPHONE ENCOUNTER
Scheduled date of colonoscopy (as of today): 1/4/23  Physician performing colonoscopy: Mitchell  Location of colonoscopy: [facility] reviewed with patient: [facility]- mailed  Instructions reviewed with patient by: Jeff SMITH  Clearances:

## 2023-01-04 ENCOUNTER — ANESTHESIA (OUTPATIENT)
Dept: GASTROENTEROLOGY | Facility: HOSPITAL | Age: 58
End: 2023-01-04

## 2023-01-04 ENCOUNTER — HOSPITAL ENCOUNTER (OUTPATIENT)
Dept: GASTROENTEROLOGY | Facility: HOSPITAL | Age: 58
Setting detail: OUTPATIENT SURGERY
Discharge: HOME | End: 2023-01-04
Attending: INTERNAL MEDICINE | Admitting: INTERNAL MEDICINE

## 2023-01-04 ENCOUNTER — ANESTHESIA EVENT (OUTPATIENT)
Dept: GASTROENTEROLOGY | Facility: HOSPITAL | Age: 58
End: 2023-01-04

## 2023-01-04 VITALS
BODY MASS INDEX: 28.1 KG/M2 | HEIGHT: 61 IN | HEART RATE: 71 BPM | TEMPERATURE: 98.1 F | DIASTOLIC BLOOD PRESSURE: 76 MMHG | OXYGEN SATURATION: 98 % | RESPIRATION RATE: 15 BRPM | WEIGHT: 148.81 LBS | SYSTOLIC BLOOD PRESSURE: 111 MMHG

## 2023-01-04 DIAGNOSIS — Z12.11 SCREENING FOR COLON CANCER: ICD-10-CM

## 2023-01-04 RX ORDER — PROPOFOL 10 MG/ML
INJECTION, EMULSION INTRAVENOUS AS NEEDED
Status: DISCONTINUED | OUTPATIENT
Start: 2023-01-04 | End: 2023-01-04

## 2023-01-04 RX ORDER — SODIUM CHLORIDE, SODIUM LACTATE, POTASSIUM CHLORIDE, CALCIUM CHLORIDE 600; 310; 30; 20 MG/100ML; MG/100ML; MG/100ML; MG/100ML
INJECTION, SOLUTION INTRAVENOUS CONTINUOUS PRN
Status: DISCONTINUED | OUTPATIENT
Start: 2023-01-04 | End: 2023-01-04

## 2023-01-04 RX ORDER — LIDOCAINE HYDROCHLORIDE 20 MG/ML
INJECTION, SOLUTION EPIDURAL; INFILTRATION; INTRACAUDAL; PERINEURAL AS NEEDED
Status: DISCONTINUED | OUTPATIENT
Start: 2023-01-04 | End: 2023-01-04

## 2023-01-04 RX ADMIN — PROPOFOL 100 MG: 10 INJECTION, EMULSION INTRAVENOUS at 08:11

## 2023-01-04 RX ADMIN — SODIUM CHLORIDE, SODIUM LACTATE, POTASSIUM CHLORIDE, AND CALCIUM CHLORIDE: .6; .31; .03; .02 INJECTION, SOLUTION INTRAVENOUS at 08:10

## 2023-01-04 RX ADMIN — PROPOFOL 50 MG: 10 INJECTION, EMULSION INTRAVENOUS at 08:17

## 2023-01-04 RX ADMIN — LIDOCAINE HYDROCHLORIDE 100 MG: 20 INJECTION, SOLUTION EPIDURAL; INFILTRATION; INTRACAUDAL at 08:11

## 2023-01-04 RX ADMIN — PROPOFOL 50 MG: 10 INJECTION, EMULSION INTRAVENOUS at 08:14

## 2023-01-04 NOTE — ANESTHESIA POSTPROCEDURE EVALUATION
Post-Op Assessment Note    CV Status:  Stable  Pain Score: 0    Pain management: adequate     Mental Status:  Alert and awake   Hydration Status:  Euvolemic   PONV Controlled:  Controlled   Airway Patency:  Patent      Post Op Vitals Reviewed: Yes      Staff: CRNA         No notable events documented      /71 (01/04/23 0830)    Temp 98 1 °F (36 7 °C) (01/04/23 0830)    Pulse 64 (01/04/23 0830)   Resp 16 (01/04/23 0830)    SpO2 95 % (01/04/23 0830)

## 2023-01-04 NOTE — H&P
History and Physical - [facility]  Yaritza Guerrero 61 y o  female MRN: [id number]                  HPI: Yaritza Guerrero is a 61y o  year old female who presents for initial increased risk screening colonoscopy  Family history of colon polyps in the mother and the brother  No prior colonoscopy  Asymptomatic      REVIEW OF SYSTEMS: Per the HPI, and otherwise unremarkable  Historical Information   Past Medical History:   Diagnosis Date   • Anemia    • Head injury     about age 14 ? • Headache, tension-type      Past Surgical History:   Procedure Laterality Date   • NO PAST SURGERIES       Social History   Social History     Substance and Sexual Activity   Alcohol Use No     Social History     Substance and Sexual Activity   Drug Use No     Social History     Tobacco Use   Smoking Status Never   Smokeless Tobacco Never     Family History   Problem Relation Age of Onset   • No Known Problems Mother    • Hypertension Father    • Sudden death Father    • Hypertension Brother    • Hypertension Brother    • Cancer Maternal Uncle        Meds/Allergies     (Not in a hospital admission)      No Known Allergies    Objective     There were no vitals taken for this visit        PHYSICAL EXAM    Gen: NAD  CV: RRR  CHEST: Clear  ABD: soft, NT/ND  EXT: no edema  Neuro: AAO      ASSESSMENT/PLAN:  This is a 61y o  year old female here for family history of colon polyps in 2 first-degree relatives    PLAN:   Procedure: Colonoscopy

## 2023-01-04 NOTE — INTERVAL H&P NOTE
H&P reviewed  After examining the patient I find no changes in the patients condition since the H&P had been written      Vitals:    01/04/23 0702   BP: 114/73   Pulse: 66   Resp: 16   Temp: 97 9 °F (36 6 °C)   SpO2: 99%

## 2023-02-01 ENCOUNTER — TELEPHONE (OUTPATIENT)
Dept: NEUROLOGY | Facility: CLINIC | Age: 58
End: 2023-02-01

## 2023-02-24 ENCOUNTER — EVALUATION (OUTPATIENT)
Dept: PHYSICAL THERAPY | Age: 58
End: 2023-02-24

## 2023-02-24 DIAGNOSIS — M25.562 PAIN IN BOTH KNEES, UNSPECIFIED CHRONICITY: Primary | ICD-10-CM

## 2023-02-24 DIAGNOSIS — M25.561 PAIN IN BOTH KNEES, UNSPECIFIED CHRONICITY: Primary | ICD-10-CM

## 2023-02-24 NOTE — PROGRESS NOTES
PT Evaluation     Today's date: 2023  Patient name: Del Kehr  : 1965  MRN: [id number]  Referring provider: Adam Verduzco MD  Dx:   Encounter Diagnosis     ICD-10-CM    1  Pain in both knees, unspecified chronicity  M25 561     M25 562                      Assessment  Assessment details: Del Kehr is a 61 y o  female who presents with bilateral knee pain, decreased  LE strength and decreased ROM with gait antalgia  Due to these impairments, Patient has difficulty performing a/iadls and recreational activities including weekly exercise program that she enjoys    Patient's clinical presentation is consistent with their referring diagnosis  Patient would benefit from a trial of skilled physical therapy to address their aforementioned impairments, improve their level of function and to improve their overall quality of life  Impairments: abnormal or restricted ROM, activity intolerance, impaired physical strength, lacks appropriate home exercise program and pain with function  Understanding of Dx/Px/POC: good   Prognosis: good    Goals  ST-3 WEEKS  1  Decrease pain by 3-5  points on VAS at its worst   2   Increase ROM bilateral knee to [facility] without pain increase  3   Increase LE strength by 1/2 MMT grade in all deficient planes hip and knee musculature    LT-6 WEEKS  1  Patient to be independent with a/iadls with increased standing tolerance  2  Increase functional activities for leisure and home activities to previous LOF including return to gym program 3-5 days per week  3  Independent with HEP and/or fitness program   4  Improved confidence with stair and all surface ambulation with no antalgia      Plan  Patient would benefit from: skilled physical therapy  Planned modality interventions: cryotherapy, thermotherapy: hydrocollator packs and low level laser therapy  Planned therapy interventions: aquatic therapy, functional ROM exercises, home exercise program, joint mobilization, manual therapy, neuromuscular re-education, patient education, postural training, therapeutic activities and therapeutic exercise  Frequency: 2x per week  Duration in visits: 10  Treatment plan discussed with: patient        Subjective Evaluation    History of Present Illness  Mechanism of injury: Left knee pain that started over one year ago; only remembers banging knee on door jamb  Over time right knee pain started that recently got progressively worse  Notes several weeks ago had sharp pain medial knee and felt a pop  (right knee)    Notes stairs challenging  Saw MD last year  No diagnostic testing at that time  Attends gym over the past several yearsfor total body workout  Enjoys squats and intense exercise and is seeing a  one time per week  Missed classes because of knee or back pain  Left knee pain medial and superior knee  Worse with stairs  Notes feels like something is loose    Can be painful in multiple positions  Interrupted sleep  Some relief with Ibuprofen  Wore brace left knee last year  None recently  Quality of life: good    Pain  Current pain ratin  At best pain ratin  At worst pain ratin  Quality: throbbing and dull ache  Aggravating factors: standing, walking, stair climbing, nothing and sitting  Progression: worsening    Social Support  Lives in: multiple-level home  Lives with: significant other    Exercise history: 5 days week at gym;    Treatments  Current treatment: medication  Patient Goals  Patient goals for therapy: decreased pain and return to sport/leisure activities  Patient goal: knees to be stronger; greater ease in stairs; trust knees more;        Objective     Observations     Right Knee   Positive for edema  Tenderness   Left Knee   Tenderness in the medial joint line and patellar tendon  Right Knee   Tenderness in the inferior patella, medial joint line and patellar tendon       Neurological Testing     Sensation     Knee   Left Knee   Intact: light touch    Right Knee   Intact: light touch     Active Range of Motion   Left Knee   Flexion: 118 degrees   Extension: 0 degrees     Right Knee   Flexion: 115 degrees   Extension: -3 degrees     Passive Range of Motion   Left Knee   Flexion: 120 degrees   Extension: 0 degrees     Right Knee   Flexion: 120 degrees   Extension: 0 degrees     Mobility   Patellar Mobility:   Left Knee   Hypermobile: left medial and left lateral      Right Knee   Hypermobile: medial and lateral     Strength/Myotome Testing     Left Hip   Planes of Motion   Flexion: 4  Abduction: 4-  Adduction: 4    Right Hip   Planes of Motion   Flexion: 4  Abduction: 4-  Adduction: 4    Left Knee   Flexion: 4-  Extension: 4-  Quadriceps contraction: good    Right Knee   Flexion: 4-  Extension: 4-  Quadriceps contraction: fair    Swelling     Left Knee Girth Measurement (cm)   Joint line: 38 cm    Right Knee Girth Measurement (cm)   Joint line: 39 cm    Ambulation   Weight-Bearing Status   Assistive device used: none    Ambulation: Level Surfaces   Ambulation without assistive device: independent    Ambulation: Stairs   Ascend stairs: independent  Pattern: non-reciprocal  Descend stairs: independent  Pattern: non-reciprocal    Observational Gait   Gait: antalgic   Decreased walking speed and right stance time       General Comments:      Knee Comments  Measured with pants resting just above knee joints      Flowsheet Rows    Flowsheet Row Most Recent Value   PT/OT G-Codes    Current Score 37   Projected Score 59             Precautions:       Manuals 2/24                                                                Neuro Re-Ed             KT tape R knee standard with pat tendon decompression 10'                                                                                          Ther Ex             Ball heel slides with quad set 10x            SLR             SLR with ER             Ball add set 3"/10x            TB ABD BTB  10x            Bridge  10x NuStep/Bike             slant                          TB side stepping                                                    Ther Activity                                       Gait Training                                       Modalities

## 2023-02-24 NOTE — LETTER
2023    Rosy Lee MD  [address] [facility] [address] [place] [address]    Patient: Leonidas Yoder   YOB: 1965   Date of Visit: 2023     Encounter Diagnosis     ICD-10-CM    1  Pain in both knees, unspecified chronicity  M25 561     M25 562           Dear Dr Sandy Wang:    Thank you for your recent referral of Leonidas Yoder  Please review the attached evaluation summary from Rosa's recent visit  Please verify that you agree with the plan of care by signing the attached order  If you have any questions or concerns, please do not hesitate to call  I sincerely appreciate the opportunity to share in the care of one of your patients and hope to have another opportunity to work with you in the near future  Sincerely,    Helder De Santiago, PT      Referring Provider:      I certify that I have read the below Plan of Care and certify the need for these services furnished under this plan of treatment while under my care  Rosy Lee MD  [address] [facility] [address]  Via Fax: [phone]          PT Evaluation     Today's date: 2023  Patient name: Leonidas Yoder  : 1965  MRN: [id number]  Referring provider: Ceferino Moncada MD  Dx:   Encounter Diagnosis     ICD-10-CM    1  Pain in both knees, unspecified chronicity  M25 561     M25 562                      Assessment  Assessment details: Leonidas Yoder is a 61 y o  female who presents with bilateral knee pain, decreased  LE strength and decreased ROM with gait antalgia  Due to these impairments, Patient has difficulty performing a/iadls and recreational activities including weekly exercise program that she enjoys    Patient's clinical presentation is consistent with their referring diagnosis   Patient would benefit from a trial of skilled physical therapy to address their aforementioned impairments, improve their level of function and to improve their overall quality of life   Impairments: abnormal or restricted ROM, activity intolerance, impaired physical strength, lacks appropriate home exercise program and pain with function  Understanding of Dx/Px/POC: good   Prognosis: good    Goals  ST-3 WEEKS  1  Decrease pain by 3-5  points on VAS at its worst   2   Increase ROM bilateral knee to [facility] without pain increase  3   Increase LE strength by 1/2 MMT grade in all deficient planes hip and knee musculature    LT-6 WEEKS  1  Patient to be independent with a/iadls with increased standing tolerance  2  Increase functional activities for leisure and home activities to previous LOF including return to gym program 3-5 days per week  3  Independent with HEP and/or fitness program   4  Improved confidence with stair and all surface ambulation with no antalgia  Plan  Patient would benefit from: skilled physical therapy  Planned modality interventions: cryotherapy, thermotherapy: hydrocollator packs and low level laser therapy  Planned therapy interventions: aquatic therapy, functional ROM exercises, home exercise program, joint mobilization, manual therapy, neuromuscular re-education, patient education, postural training, therapeutic activities and therapeutic exercise  Frequency: 2x per week  Duration in visits: 10  Treatment plan discussed with: patient        Subjective Evaluation    History of Present Illness  Mechanism of injury: Left knee pain that started over one year ago; only remembers banging knee on door jamb  Over time right knee pain started that recently got progressively worse  Notes several weeks ago had sharp pain medial knee and felt a pop  (right knee)    Notes stairs challenging  Saw MD last year  No diagnostic testing at that time  Attends gym over the past several yearsfor total body workout  Enjoys squats and intense exercise and is seeing a  one time per week  Missed classes because of knee or back pain       Left knee pain medial and superior knee  Worse with stairs  Notes feels like something is loose    Can be painful in multiple positions  Interrupted sleep  Some relief with Ibuprofen  Wore brace left knee last year  None recently  Quality of life: good    Pain  Current pain ratin  At best pain ratin  At worst pain ratin  Quality: throbbing and dull ache  Aggravating factors: standing, walking, stair climbing, nothing and sitting  Progression: worsening    Social Support  Lives in: multiple-level home  Lives with: significant other    Exercise history: 5 days week at gym;    Treatments  Current treatment: medication  Patient Goals  Patient goals for therapy: decreased pain and return to sport/leisure activities  Patient goal: knees to be stronger; greater ease in stairs; trust knees more;        Objective     Observations     Right Knee   Positive for edema  Tenderness   Left Knee   Tenderness in the medial joint line and patellar tendon  Right Knee   Tenderness in the inferior patella, medial joint line and patellar tendon       Neurological Testing     Sensation     Knee   Left Knee   Intact: light touch    Right Knee   Intact: light touch     Active Range of Motion   Left Knee   Flexion: 118 degrees   Extension: 0 degrees     Right Knee   Flexion: 115 degrees   Extension: -3 degrees     Passive Range of Motion   Left Knee   Flexion: 120 degrees   Extension: 0 degrees     Right Knee   Flexion: 120 degrees   Extension: 0 degrees     Mobility   Patellar Mobility:   Left Knee   Hypermobile: left medial and left lateral      Right Knee   Hypermobile: medial and lateral     Strength/Myotome Testing     Left Hip   Planes of Motion   Flexion: 4  Abduction: 4-  Adduction: 4    Right Hip   Planes of Motion   Flexion: 4  Abduction: 4-  Adduction: 4    Left Knee   Flexion: 4-  Extension: 4-  Quadriceps contraction: good    Right Knee   Flexion: 4-  Extension: 4-  Quadriceps contraction: fair    Swelling     Left Knee Girth Measurement (cm) Joint line: 38 cm    Right Knee Girth Measurement (cm)   Joint line: 39 cm    Ambulation   Weight-Bearing Status   Assistive device used: none    Ambulation: Level Surfaces   Ambulation without assistive device: independent    Ambulation: Stairs   Ascend stairs: independent  Pattern: non-reciprocal  Descend stairs: independent  Pattern: non-reciprocal    Observational Gait   Gait: antalgic   Decreased walking speed and right stance time       General Comments:      Knee Comments  Measured with pants resting just above knee joints      Flowsheet Rows    Flowsheet Row Most Recent Value   PT/OT G-Codes    Current Score 37   Projected Score 59            Precautions:       Manuals 2/24                                                                Neuro Re-Ed             KT tape R knee standard with pat tendon decompression 10'                                                                                          Ther Ex             Ball heel slides with quad set 10x            SLR             SLR with ER             Ball add set 3"/10x            TB ABD BTB  10x            Bridge  10x                         NuStep/Bike             slant                          TB side stepping                                                    Ther Activity                                       Gait Training                                       Modalities

## 2023-03-01 ENCOUNTER — OFFICE VISIT (OUTPATIENT)
Dept: PHYSICAL THERAPY | Age: 58
End: 2023-03-01

## 2023-03-01 DIAGNOSIS — M25.562 PAIN IN BOTH KNEES, UNSPECIFIED CHRONICITY: Primary | ICD-10-CM

## 2023-03-01 DIAGNOSIS — M25.561 PAIN IN BOTH KNEES, UNSPECIFIED CHRONICITY: Primary | ICD-10-CM

## 2023-03-01 NOTE — PROGRESS NOTES
Daily Note     Today's date: 3/1/2023  Patient name: Ebony Matthews  : 1965  MRN: [id number]  Referring provider: Wilma Hernandez MD  Dx:   Encounter Diagnosis     ICD-10-CM    1  Pain in both knees, unspecified chronicity  M25 561     M25 562                      Subjective: Increased pain in R knee noted, difficulty sleeping last night secondary to discomfort  Tape came off same day  Suprapatellar pain and medial pain present on R knee upon arrival      Objective: See treatment diary below      Assessment: Difficulty with transitions from knee flexion to extension, stiffness and discomfort noted  L sided ITB tightness present, added ITB self stretches to address this  Challenged with SLR secondary to quad weakness  No exacerbation in sx post session  Plan: Cont per plan, progress as tolerated        Precautions:       Manuals 2/24 3/1                                                               Neuro Re-Ed             KT tape R knee standard with pat tendon decompression 10' NT                                                                                         Ther Ex             Ball heel slides with quad set 10x 20x           SLR  10x ea           SLR with ER             Ball add set 3"/10x 3"/20x           TB ABD BTB  10x BTB 20x           Bridge  10x 20x           SAQ   10x ea           NuStep/Bike  L3 5'           slant  30"x4           LAQ/Doc Kicks  20x           TB side stepping  NV                                                  Ther Activity                                       Gait Training                                       Modalities

## 2023-03-03 ENCOUNTER — OFFICE VISIT (OUTPATIENT)
Dept: PHYSICAL THERAPY | Age: 58
End: 2023-03-03

## 2023-03-03 DIAGNOSIS — M25.561 PAIN IN BOTH KNEES, UNSPECIFIED CHRONICITY: Primary | ICD-10-CM

## 2023-03-03 DIAGNOSIS — M25.562 PAIN IN BOTH KNEES, UNSPECIFIED CHRONICITY: Primary | ICD-10-CM

## 2023-03-03 NOTE — PROGRESS NOTES
Daily Note     Today's date: 3/3/2023  Patient name: Shaw Villafana  : 1965  MRN: [id number]  Referring provider: Deepthi Vera MD  Dx:   Encounter Diagnosis     ICD-10-CM    1  Pain in both knees, unspecified chronicity  M25 561     M25 562           Start Time: 1015  Stop Time: 1055  Total time in clinic (min): 40 minutes    Subjective: Patient reports no pain this morning in her knees, but last night she had pain in both knees  Objective: See treatment diary below      Assessment: Tolerated treatment well  Patient participated in skilled PT session focused on strengthening, stretching, and ROM  Added VMO to SLR and SAQs which patient tolerated well with no increase in symptoms  Patient would continue to benefit from skilled PT interventions to address strengthening, stretching, and ROM  Patient demonstrated fatigue post treatment      Plan: Continue per plan of care        Precautions:       Manuals 2/24 3/1 3/3                                                              Neuro Re-Ed             KT tape R knee standard with pat tendon decompression 10' NT NT                                                                                        Ther Ex             Ball heel slides with quad set 10x 20x 20x          SLR  10x ea 10x ea          SLR with ER   10x ea          Ball add set 3"/10x 3"/20x 5"/20x          TB ABD BTB  10x BTB 20x Blue TB 5"/20x          Bridge  10x 20x 20x          SAQ   10x ea 3"/10x w/ER ea          NuStep/Bike  L3 5' L3 5'          slant  30"x4 30"x4          LAQ/Doc Kicks  20x 5"/20x ea          TB side stepping  NV RTB 40Ft x 2                                                 Ther Activity                                       Gait Training                                       Modalities

## 2023-03-08 ENCOUNTER — OFFICE VISIT (OUTPATIENT)
Dept: PHYSICAL THERAPY | Age: 58
End: 2023-03-08

## 2023-03-08 DIAGNOSIS — M25.562 PAIN IN BOTH KNEES, UNSPECIFIED CHRONICITY: Primary | ICD-10-CM

## 2023-03-08 DIAGNOSIS — M25.561 PAIN IN BOTH KNEES, UNSPECIFIED CHRONICITY: Primary | ICD-10-CM

## 2023-03-08 NOTE — PROGRESS NOTES
Daily Note     Today's date: 3/8/2023  Patient name: Lasha Pelaez  : 1965  MRN: [id number]  Referring provider: Cherry Rahman MD  Dx:   Encounter Diagnosis     ICD-10-CM    1  Pain in both knees, unspecified chronicity  M25 561     M25 562                      Subjective: Pain occurring on and off, inability to descend stairs reciprocally secondary to knee pain and decreased strength  Objective: See treatment diary below      Assessment: Progressed exercises this visit, added futher VMO activation exercises  Trailed tape this visit on B/L knees with good carry over, improved stability and reduced pain following  Consider progression of TE NV  Reviewed application and wear time of k-tape, pt demonstrates verbal understanding  Plan: Continue per plan of care        Precautions:       Manuals 2/24 3/1 3/3 3/8         B/L knee ROM    5' ea JK                                                Neuro Re-Ed             KT tape R knee standard with pat tendon decompression 10' NT NT Medial off load & patella strap (U) 10'                                                                                       Ther Ex             Ball heel slides with quad set 10x 20x 20x 20x         SLR  10x ea 10x ea 20x ea         SLR with ER   10x ea 10x ea         Ball add set 3"/10x 3"/20x 5"/20x 5"/20x         TB ABD BTB  10x BTB 20x Blue TB 5"/20x Blue TB 5"/20x         Bridge  10x 20x 20x          SAQ   10x ea 3"/10x w/ER ea 3"/15  3"/15 with ER         NuStep/Bike  L3 5' L3 5' L4 7'         slant  30"x4 30"x4 30"x4         LAQ/Doc Kicks  20x 5"/20x ea 1# 20x         TB side stepping  NV RTB 40Ft x 2 RTB 20 ft x2         LAQ VMO activation    20x                                   Ther Activity                                       Gait Training                                       Modalities

## 2023-03-10 ENCOUNTER — OFFICE VISIT (OUTPATIENT)
Dept: PHYSICAL THERAPY | Age: 58
End: 2023-03-10

## 2023-03-10 DIAGNOSIS — M25.562 PAIN IN BOTH KNEES, UNSPECIFIED CHRONICITY: Primary | ICD-10-CM

## 2023-03-10 DIAGNOSIS — M25.561 PAIN IN BOTH KNEES, UNSPECIFIED CHRONICITY: Primary | ICD-10-CM

## 2023-03-10 NOTE — PROGRESS NOTES
Daily Note     Today's date: 3/10/2023  Patient name: Geoff Lala  : 1965  MRN: [id number]  Referring provider: Ashley Crabtree MD  Dx:   Encounter Diagnosis     ICD-10-CM    1  Pain in both knees, unspecified chronicity  M25 561     M25 562           Start Time: 945  Stop Time: 1035  Total time in clinic (min): 50 minutes    Subjective: Reports occasional sharp pains with certain movements  Does find relief with tape application  Objective: See treatment diary below      Assessment: Tolerated treatment well  Mild discomfort at R medial knee at end range flexion PROM that subsides at rest  Emphasis on VMO strengthening  Added tband to bridges with good tolerance  Cues for carryover of program and to ensure proper techniques are maintained  Monitoring to ensure proper dosage is performed  Patient would benefit from continued PT  Plan: Continue per plan of care        Precautions:       Manuals 2/24 3/1 3/3 3/8 3/10        B/L knee ROM    5' ea JK 8' JR                                                Neuro Re-Ed             KT tape R knee standard with pat tendon decompression 10' NT NT Medial off load & patella strap (U) 10' Medial off load & patella strap (U) 10'                                                                                      Ther Ex             Ball heel slides with quad set 10x 20x 20x 20x 20x         SLR  10x ea 10x ea 20x ea 20x ea        SLR with ER   10x ea 10x ea 15x ea        Ball add set 3"/10x 3"/20x 5"/20x 5"/20x 5''x20        TB ABD BTB  10x BTB 20x Blue TB 5"/20x Blue TB 5"/20x Blue tband 5''x20        Bridge  10x 20x 20x  BTB 20x         SAQ   10x ea 3"/10x w/ER ea 3"/15  3"/15 with ER 3"/15  3"/15 with ER        NuStep/Bike  L3 5' L3 5' L4 7' L4 10'         slant  30"x4 30"x4 30"x4 30''x4        LAQ/Doc Kicks  20x 5"/20x ea 1# 20x 1# 20x ea        TB side stepping  NV RTB 40Ft x 2 RTB 20 ft x2 BTB 2 laps        LAQ VMO activation (ball between knees)    20x 20x Ther Activity                                       Gait Training                                       Modalities

## 2023-03-14 NOTE — PROGRESS NOTES
Daily Note     Today's date: 3/15/2023  Patient name: Yvonne Chirstianson  : 1965  MRN: [id number]  Referring provider: Fernando Gaines MD  Dx:   Encounter Diagnosis     ICD-10-CM    1  Pain in both knees, unspecified chronicity  M25 561     M25 562           Start Time: 1230  Stop Time: 1330  Total time in clinic (min): 60 minutes    Subjective:  Notes medial right knee pain at 7/10 and at nighttime pain increases 8-9/10 at night  Notes pain with prolonged sitting  Stairs challenging especially descending stairs  Notes better since starting PT but doesn't trust       Objective: See treatment diary below      Assessment: Tolerated treatment well  Patient would benefit from continued PT Better tolerance for terminal knee extension  Increased weights with exercise with no increased pain  Could benefit from additional strengthening      Plan: Continue per plan of care        Precautions:       Manuals 2/24 3/1 3/3 3/8 3/10 3/15       B/L knee ROM    5' ea JK 8' JR  nt                                              Neuro Re-Ed             KT tape R knee standard with pat tendon decompression 10' NT NT Medial off load & patella strap (U) 10' Medial off load & patella strap (U) 10' Both   knees  10'                                                                                     Ther Ex             Ball heel slides with quad set 10x 20x 20x 20x 20x  20x       SLR  10x ea 10x ea 20x ea 20x ea 2#/  2x10       SLR with ER   10x ea 10x ea 15x ea 2#/  2x10       Ball add set 3"/10x 3"/20x 5"/20x 5"/20x 5''x20 CYBX  35#/  3x10       TB ABD BTB  10x BTB 20x Blue TB 5"/20x Blue TB 5"/20x Blue tband 5''x20 CYBX  30#  10x  35#  2x10       Bridge  10x 20x 20x  BTB 20x  BTB  20x       SAQ   10x ea 3"/10x w/ER ea 3"/15  3"/15 with ER 3"/15  3"/15 with ER 3x15"       NuStep/Bike  L3 5' L3 5' L4 7' L4 10'  L4  10'       slant  30"x4 30"x4 30"x4 30''x4 4x       LAQ/Doc Kicks  20x 5"/20x ea 1# 20x 1# 20x ea 2#       TB side stepping  NV RTB 40Ft x 2 RTB 20 ft x2 BTB 2 laps BTB  2 laps       LAQ VMO activation (ball between knees)    20x 20x  2#  2x10  ball                                 Ther Activity                                       Gait Training                                       Modalities

## 2023-03-15 ENCOUNTER — OFFICE VISIT (OUTPATIENT)
Dept: PHYSICAL THERAPY | Age: 58
End: 2023-03-15

## 2023-03-15 DIAGNOSIS — M25.561 PAIN IN BOTH KNEES, UNSPECIFIED CHRONICITY: Primary | ICD-10-CM

## 2023-03-15 DIAGNOSIS — M25.562 PAIN IN BOTH KNEES, UNSPECIFIED CHRONICITY: Primary | ICD-10-CM

## 2023-03-17 ENCOUNTER — OFFICE VISIT (OUTPATIENT)
Dept: PHYSICAL THERAPY | Age: 58
End: 2023-03-17

## 2023-03-17 DIAGNOSIS — M25.561 PAIN IN BOTH KNEES, UNSPECIFIED CHRONICITY: Primary | ICD-10-CM

## 2023-03-17 DIAGNOSIS — M25.562 PAIN IN BOTH KNEES, UNSPECIFIED CHRONICITY: Primary | ICD-10-CM

## 2023-03-17 NOTE — PROGRESS NOTES
Daily Note     Today's date: 3/17/2023  Patient name: Shaw Villafana  : 1965  MRN: [id number]  Referring provider: Deepthi Vera MD  Dx:   Encounter Diagnosis     ICD-10-CM    1  Pain in both knees, unspecified chronicity  M25 561     M25 562           Start Time: 845  Stop Time: 930  Total time in clinic (min): 45 minutes    Subjective: Reports feeling better, declines Rock tape today and would like to trial without it  Objective: See treatment diary below      Assessment: Tolerated treatment well  Added wall sits with tband today, discomfort noted when in squatted position after activity was completed but did note it eased once returning to a standing position  Cues to modify ROM to a pain-free ROM, and will modify next session  Cues for carryover of program and to ensure proper form is maintained and quantity is performed  No c/o pain post session  Patient would benefit from continued PT  Plan: Continue per plan of care        Precautions:       Manuals 2/24 3/1 3/3 3/8 3/10 3/15 3/17      B/L knee ROM    5' ea JK 8' JR  nt NP                                             Neuro Re-Ed             KT tape R knee standard with pat tendon decompression 10' NT NT Medial off load & patella strap (U) 10' Medial off load & patella strap (U) 10' Both   knees  10' declined                                                                                    Ther Ex             Ball heel slides with quad set 10x 20x 20x 20x 20x  20x 20x       SLR  10x ea 10x ea 20x ea 20x ea 2#/  2x10 2# 2x10       SLR with ER   10x ea 10x ea 15x ea 2#/  2x10 2# 2x10       Ball add set 3"/10x 3"/20x 5"/20x 5"/20x 5''x20 CYBX  35#/  3x10 35# 30x      TB ABD BTB  10x BTB 20x Blue TB 5"/20x Blue TB 5"/20x Blue tband 5''x20 CYBX  30#  10x  35#  2x10 35# 30x       Bridge  10x 20x 20x  BTB 20x  BTB  20x BTB 20x       SAQ   10x ea 3"/10x w/ER ea 3"/15  3"/15 with ER 3"/15  3"/15 with ER 3x15" 2# 3"x15       NuStep/Bike  L3 5' L3 5' L4 7' L4 10'  L4  10' L5 10'       slant  30"x4 30"x4 30"x4 30''x4 4x 4x       LAQ/Doc Kicks  20x 5"/20x ea 1# 20x 1# 20x ea 2# D/C       TB side stepping  NV RTB 40Ft x 2 RTB 20 ft x2 BTB 2 laps BTB  2 laps BTB 2 laps       LAQ VMO activation (ball between knees)    20x 20x  2#  2x10  ball 2# 2x10 ball       Wall sits with tband        5''x10 BTB  Modify range                  Ther Activity                                       Gait Training                                       Modalities

## 2023-03-22 ENCOUNTER — OFFICE VISIT (OUTPATIENT)
Dept: PHYSICAL THERAPY | Age: 58
End: 2023-03-22

## 2023-03-22 DIAGNOSIS — M25.561 PAIN IN BOTH KNEES, UNSPECIFIED CHRONICITY: Primary | ICD-10-CM

## 2023-03-22 DIAGNOSIS — M25.562 PAIN IN BOTH KNEES, UNSPECIFIED CHRONICITY: Primary | ICD-10-CM

## 2023-03-22 NOTE — PROGRESS NOTES
Daily Note     Today's date: 3/22/2023  Patient name: Pavel Raymundo  : 1965  MRN: [id number]  Referring provider: Donny Cochran MD  Dx:   Encounter Diagnosis     ICD-10-CM    1  Pain in both knees, unspecified chronicity  M25 561     M25 562           Start Time: 08  Stop Time: 930  Total time in clinic (min): 45 minutes    Subjective: Reports elevated pain since last session, patient unsure if the tape was really helping or if it was the introduction of wall squats  Objective: See treatment diary below      Assessment: Tolerated treatment fair  Occasional c/o elevated pain when pushing on Nustep, will try bike next session  Brian Hannifin tape today and held wall sits, will assess response next session and cont with modifications if needed  Patient was able to complete program as outlined below despite elevated soreness upon arrival  Instructed to apply ice and rest as needed  Patient would benefit from continued PT      Plan: Continue per plan of care        Precautions:       Manuals 2/24 3/1 3/3 3/8 3/10 3/15 3/17 3/22     B/L knee ROM    5' ea JK 8' JR  nt NP JR                                             Neuro Re-Ed             KT tape R knee standard with pat tendon decompression 10' NT NT Medial off load & patella strap (U) 10' Medial off load & patella strap (U) 10' Both   knees  10' declined B knees 8'                                                                                   Ther Ex             Ball heel slides with quad set 10x 20x 20x 20x 20x  20x 20x  20x     SLR  10x ea 10x ea 20x ea 20x ea 2#/  2x10 2# 2x10  2# 2x10      SLR with ER   10x ea 10x ea 15x ea 2#/  2x10 2# 2x10  2# 2x10     Ball add set 3"/10x 3"/20x 5"/20x 5"/20x 5''x20 CYBX  35#/  3x10 35# 30x 35# 30x     TB ABD BTB  10x BTB 20x Blue TB 5"/20x Blue TB 5"/20x Blue tband 5''x20 CYBX  30#  10x  35#  2x10 35# 30x  35# 30x     Bridge  10x 20x 20x  BTB 20x  BTB  20x BTB 20x  BTB 20x      SAQ   10x ea 3"/10x w/ER ea 3"/15 3"/15 with ER 3"/15  3"/15 with ER 3x15" 2# 3"x15  2# 3''x15 w/ ER     NuStep/Bike  L3 5' L3 5' L4 7' L4 10'  L4  10' L5 10'  L5 10'  Bike     slant  30"x4 30"x4 30"x4 30''x4 4x 4x  4x      LAQ/Doc Kicks  20x 5"/20x ea 1# 20x 1# 20x ea 2# D/C       TB side stepping  NV RTB 40Ft x 2 RTB 20 ft x2 BTB 2 laps BTB  2 laps BTB 2 laps  BTB 2 laps      LAQ VMO activation (ball between knees)    20x 20x  2#  2x10  ball 2# 2x10 ball  2# 2x10 ball      Wall sits with tband        5''x10 BTB  Hold                   Ther Activity                                       Gait Training                                       Modalities

## 2023-03-24 ENCOUNTER — OFFICE VISIT (OUTPATIENT)
Dept: PHYSICAL THERAPY | Age: 58
End: 2023-03-24

## 2023-03-24 DIAGNOSIS — M25.562 PAIN IN BOTH KNEES, UNSPECIFIED CHRONICITY: Primary | ICD-10-CM

## 2023-03-24 DIAGNOSIS — M25.561 PAIN IN BOTH KNEES, UNSPECIFIED CHRONICITY: Primary | ICD-10-CM

## 2023-03-24 NOTE — PROGRESS NOTES
Daily Note     Today's date: 3/24/2023  Patient name: Ninoska Conrad  : 1965  MRN: [id number]  Referring provider: Stephen Evans MD  Dx:   Encounter Diagnosis     ICD-10-CM    1  Pain in both knees, unspecified chronicity  M25 561     M25 562           Start Time: 08  Stop Time: 45  Total time in clinic (min): 60 minutes    Subjective: Reports feeling a little better, but still experiencing intermittent pain at her knee depending at movement  Objective: See treatment diary below      Assessment: Tolerated treatment well  Initial discomfort at L knee when on bike, but dissipated with repetition  Patient was able to complete all exercises as documented below without c/o elevated pain  Decreased pain post session  Patient would benefit from continued PT  Plan: Continue per plan of care        Precautions:       Manuals 2/24 3/1 3/3 3/8 3/10 3/15 3/17 3/22 3/24    B/L knee ROM    5' ea JK 8' JR  nt NP JR                                             Neuro Re-Ed             KT tape R knee standard with pat tendon decompression 10' NT NT Medial off load & patella strap (U) 10' Medial off load & patella strap (U) 10' Both   knees  10' declined B knees 8' B knees 8'                                                                                  Ther Ex             Ball heel slides with quad set 10x 20x 20x 20x 20x  20x 20x  20x 20x     SLR  10x ea 10x ea 20x ea 20x ea 2#/  2x10 2# 2x10  2# 2x10  2# 2x10    SLR with ER   10x ea 10x ea 15x ea 2#/  2x10 2# 2x10  2# 2x10 2# 2x10    Ball add set 3"/10x 3"/20x 5"/20x 5"/20x 5''x20 CYBX  35#/  3x10 35# 30x 35# 30x 35# 30x    TB ABD BTB  10x BTB 20x Blue TB 5"/20x Blue TB 5"/20x Blue tband 5''x20 CYBX  30#  10x  35#  2x10 35# 30x  35# 30x 35# 30x     Bridge  10x 20x 20x  BTB 20x  BTB  20x BTB 20x  BTB 20x  BTB 20x    SAQ   10x ea 3"/10x w/ER ea 3"/15  3"/15 with ER 3"/15  3"/15 with ER 3x15" 2# 3"x15  2# 3''x15 w/ ER 2# 3"15 w/ ER    NuStep/Bike  L3 5' L3 5' L4 7' L4 10'  L4  10' L5 10'  L5 10'  Bike     slant  30"x4 30"x4 30"x4 30''x4 4x 4x  4x  4x     LAQ/Doc Kicks  20x 5"/20x ea 1# 20x 1# 20x ea 2# D/C       TB side stepping  NV RTB 40Ft x 2 RTB 20 ft x2 BTB 2 laps BTB  2 laps BTB 2 laps  BTB 2 laps  BTB 2 laps    LAQ VMO activation (ball between knees)    20x 20x  2#  2x10  ball 2# 2x10 ball  2# 2x10 ball  2# 2x10 ball     Wall sits with tband        5''x10 BTB  Hold                   Ther Activity                                       Gait Training                                       Modalities

## 2023-03-29 ENCOUNTER — OFFICE VISIT (OUTPATIENT)
Dept: PHYSICAL THERAPY | Age: 58
End: 2023-03-29

## 2023-03-29 DIAGNOSIS — M25.561 PAIN IN BOTH KNEES, UNSPECIFIED CHRONICITY: Primary | ICD-10-CM

## 2023-03-29 DIAGNOSIS — M25.562 PAIN IN BOTH KNEES, UNSPECIFIED CHRONICITY: Primary | ICD-10-CM

## 2023-03-29 NOTE — PROGRESS NOTES
"Daily Note     Today's date: 3/29/2023  Patient name: Dona Bray  : 1965  MRN: [id number]  Referring provider: Clement West MD  Dx:   Encounter Diagnosis     ICD-10-CM    1  Pain in both knees, unspecified chronicity  M25 561     M25 562           Start Time: 08  Stop Time: 930  Total time in clinic (min): 60 minutes    Subjective: Reports no significant changes in pain  States at night when she goes to bend her knee after laying with her knee extended it pops  States it is painful but then diminishes after she moves it  Objective: See treatment diary below      Assessment: Tolerated treatment well  Some discomfort at her R knee initially on bike that eases with time and increased revolutions  Patient was able to complete program as documented below without c/o elevated pain  Cues for carryover to ensure proper techniques are maintained  Patient would benefit from continued PT  Plan: Continue per plan of care        Precautions:       Manuals 2/24 3/1 3/3 3/8 3/10 3/15 3/17 3/22 3/24 3/29   B/L knee ROM    5' ea JK 8' JR  nt NP JR   4' quad off table                                          Neuro Re-Ed             KT tape R knee standard with pat tendon decompression 10' NT NT Medial off load & patella strap (U) 10' Medial off load & patella strap (U) 10' Both   knees  10' declined B knees 8' B knees 8' B knees 8'                                                                                 Ther Ex             Ball heel slides with quad set 10x 20x 20x 20x 20x  20x 20x  20x 20x  20x   SLR  10x ea 10x ea 20x ea 20x ea 2#/  2x10 2# 2x10  2# 2x10  2# 2x10 2# 2x10   SLR with ER   10x ea 10x ea 15x ea 2#/  2x10 2# 2x10  2# 2x10 2# 2x10 2# 2x10   Ball add set 3\"/10x 3\"/20x 5\"/20x 5\"/20x 5''x20 CYBX  35#/  3x10 35# 30x 35# 30x 35# 30x 35# 30x   TB ABD BTB  10x BTB 20x Blue TB 5\"/20x Blue TB 5\"/20x Blue tband 5''x20 CYBX  30#  10x  35#  2x10 35# 30x  35# 30x 35# 30x  35# 30x    Bridge  10x 20x 20x " " BTB 20x  BTB  20x BTB 20x  BTB 20x  BTB 20x BTB 20x   SAQ   10x ea 3\"/10x w/ER ea 3\"/15  3\"/15 with ER 3\"/15  3\"/15 with ER 3x15\" 2# 3\"x15  2# 3''x15 w/ ER 2# 3\"15 w/ ER 2# 3\"15 w/ ER   NuStep/Bike  L3 5' L3 5' L4 7' L4 10'  L4  10' L5 10'  L5 10'  Bike 10' Bike 10'    slant  30\"x4 30\"x4 30\"x4 30''x4 4x 4x  4x  4x  4x    LAQ/Doc Kicks  20x 5\"/20x ea 1# 20x 1# 20x ea 2# D/C       TB side stepping  NV RTB 40Ft x 2 RTB 20 ft x2 BTB 2 laps BTB  2 laps BTB 2 laps  BTB 2 laps  BTB 2 laps BTB 2 laps   LAQ VMO activation (ball between knees)    20x 20x  2#  2x10  ball 2# 2x10 ball  2# 2x10 ball  2# 2x10 ball  2# 2x10 ball    Wall sits with tband        5''x10 BTB  Hold                   Ther Activity                                       Gait Training                                       Modalities                                            "

## 2023-03-31 ENCOUNTER — OFFICE VISIT (OUTPATIENT)
Dept: PHYSICAL THERAPY | Age: 58
End: 2023-03-31

## 2023-03-31 DIAGNOSIS — M25.561 PAIN IN BOTH KNEES, UNSPECIFIED CHRONICITY: Primary | ICD-10-CM

## 2023-03-31 DIAGNOSIS — M25.562 PAIN IN BOTH KNEES, UNSPECIFIED CHRONICITY: Primary | ICD-10-CM

## 2023-03-31 NOTE — PROGRESS NOTES
"Daily Note     Today's date: 3/31/2023  Patient name: Bhumika Montano  : 1965  MRN: [id number]  Referring provider: Laura Slaughter MD  Dx:   Encounter Diagnosis     ICD-10-CM    1  Pain in both knees, unspecified chronicity  M25 561     M25 562           Start Time: 845  Stop Time: 945  Total time in clinic (min): 60 minutes    Subjective: Reports feeling better overall, decreased instances of sharp pain but does still get it upon awakening  Objective: See treatment diary below      Assessment: Tolerated treatment well  No c/o sharp pain t/o session, but did note some clicking reported as not painful during exercises  Continues to find relief with Rock tape application  Resumed wall squats with instruction to modify ROM to 45 degrees of flexion, no issues with this modification  Will assess response next session  Patient would benefit from continued PT  Plan: Continue per plan of care        Precautions:       Manuals 3/31  3/3 3/8 3/10 3/15 3/17 3/22 3/24 3/29   B/L knee ROM 4' quad off table   5' ea JK 8' JR  nt NP JR   4' quad off table                                          Neuro Re-Ed             KT tape R knee standard with pat tendon decompression B knees 8'  NT Medial off load & patella strap (U) 10' Medial off load & patella strap (U) 10' Both   knees  10' declined B knees 8' B knees 8' B knees 8'                                                                                 Ther Ex             Ball heel slides with quad set 20x  20x 20x 20x  20x 20x  20x 20x  20x   SLR 2# 2x10  10x ea 20x ea 20x ea 2#/  2x10 2# 2x10  2# 2x10  2# 2x10 2# 2x10   SLR with ER 2# 2x10  10x ea 10x ea 15x ea 2#/  2x10 2# 2x10  2# 2x10 2# 2x10 2# 2x10   Cybex hip add 35# 30x  5\"/20x 5\"/20x 5''x20 CYBX  35#/  3x10 35# 30x 35# 30x 35# 30x 35# 30x   Cybex hip abd 35# 30x   Blue TB 5\"/20x Blue TB 5\"/20x Blue tband 5''x20 CYBX  30#  10x  35#  2x10 35# 30x  35# 30x 35# 30x  35# 30x    Bridge  BTB 20x   20x  BTB 20x  " "BTB  20x BTB 20x  BTB 20x  BTB 20x BTB 20x   SAQ  3# 3\" 15x   3\"/10x w/ER ea 3\"/15  3\"/15 with ER 3\"/15  3\"/15 with ER 3x15\" 2# 3\"x15  2# 3''x15 w/ ER 2# 3\"15 w/ ER 2# 3\"15 w/ ER   Bike 10'   L3 5' L4 7' L4 10'  L4  10' L5 10'  L5 10'  Bike 10' Bike 10'    slant L3 30''x4  30\"x4 30\"x4 30''x4 4x 4x  4x  4x  4x    LAQ/Doc Kicks   5\"/20x ea 1# 20x 1# 20x ea 2# D/C       TB side stepping BTB 2 laps  RTB 40Ft x 2 RTB 20 ft x2 BTB 2 laps BTB  2 laps BTB 2 laps  BTB 2 laps  BTB 2 laps BTB 2 laps   LAQ VMO activation (ball between knees) 2# 2x10 ball    20x 20x  2#  2x10  ball 2# 2x10 ball  2# 2x10 ball  2# 2x10 ball  2# 2x10 ball    Wall sits to 45 deg 10x       5''x10 BTB  Hold                   Ther Activity                                       Gait Training                                       Modalities                                            "

## 2023-04-04 NOTE — PROGRESS NOTES
PT Re-Evaluation     Today's date: 2023  Patient name: Katie Perez  : 1965  MRN: [id number]  Referring provider: Camilla Mabry MD  Dx:   Encounter Diagnosis     ICD-10-CM    1  Pain in both knees, unspecified chronicity  M25 561     M25 562           Start Time: 1630  Stop Time: 1715  Total time in clinic (min): 45 minutes    Assessment  Assessment details: Katie Perez is a 61 y o  female who presented initially with bilateral knee pain, decreased  LE strength and decreased ROM with gait antalgia  Due to these impairments, Patient has difficulty performing a/iadls and recreational activities including weekly exercise program that she enjoys    Patient's clinical presentation is consistent with their referring diagnosis  Pain persists  She will have MRI testing  Patient could benefit from additional PT with goal lto transition to independent fitness and HEP over the next 3 weeks  Focus on stair negotiation and increased confidence with functional tasks  Impairments: abnormal or restricted ROM, activity intolerance, impaired physical strength, lacks appropriate home exercise program and pain with function  Understanding of Dx/Px/POC: good   Prognosis: good    Goals  ST-3 WEEKS - progressing towards  1  Decrease pain by 3-5  points on VAS at its worst   2   Increase ROM bilateral knee to [facility] without pain increase  - MET  3  Increase LE strength by 1/2 MMT grade in all deficient planes hip and knee musculature    LT-6 WEEKS  1  Patient to be independent with a/iadls with increased standing tolerance  2  Increase functional activities for leisure and home activities to previous LOF including return to gym program 3-5 days per week  3  Independent with HEP and/or fitness program   4  Improved confidence with stair and all surface ambulation with no antalgia      Plan  Patient would benefit from: skilled physical therapy  Planned modality interventions: cryotherapy, thermotherapy: hydrocollator packs "and low level laser therapy  Planned therapy interventions: aquatic therapy, functional ROM exercises, home exercise program, joint mobilization, manual therapy, neuromuscular re-education, patient education, postural training, therapeutic activities and therapeutic exercise  Frequency: 2x per week  Duration in visits: 6  Duration in weeks: 3  Treatment plan discussed with: patient        Subjective Evaluation    History of Present Illness  Mechanism of injury: Left knee pain that started over one year ago; only remembers banging knee on door jamb  Over time right knee pain started that recently got progressively worse  Notes several weeks ago had sharp pain medial knee and felt a pop  (right knee)    Notes stairs challenging  Saw MD last year  No diagnostic testing at that time  Attends gym over the past several yearsfor total body workout  Enjoys squats and intense exercise and is seeing a  one time per week  Missed classes because of knee or back pain  Left knee pain medial and superior knee  Worse with stairs  Notes feels like something is loose    Can be painful in multiple positions  Interrupted sleep  Some relief with Ibuprofen  23   Notes some relief with taping in PT as well as stretching  Pain continues with \"locking\" especially with prolonged positioning and while sleeping  Notes stair climbing remains difficult and requires rail  Reports lacks confidence in knee with fear of it giving out or locking   Seen by MD today and will have MRI testing  Quality of life: good    Pain  Current pain ratin  At best pain ratin  At worst pain ratin  Location: medial and inferior knees; right worse than left  Quality: throbbing and dull ache  Aggravating factors: standing, walking, stair climbing, nothing and sitting  Progression: no change    Social Support  Lives in: multiple-level home  Lives with: significant other    Exercise history: 5 days week at gym;    Treatments  Current " treatment: medication  Patient Goals  Patient goals for therapy: decreased pain and return to sport/leisure activities  Patient goal: knees to be stronger; greater ease in stairs; trust knees more;        Objective     Tenderness   Left Knee   Tenderness in the medial joint line and patellar tendon  Right Knee   Tenderness in the medial joint line and patellar tendon  Neurological Testing     Sensation     Knee   Left Knee   Intact: light touch    Right Knee   Intact: light touch     Active Range of Motion   Left Knee   Flexion: 122 degrees   Extension: 0 degrees     Right Knee   Flexion: 124 degrees   Extension: 0 degrees with pain    Passive Range of Motion   Left Knee   Flexion: WFL  Extension: 0 degrees     Right Knee   Flexion: WFL  Extension: 0 degrees     Mobility   Patellar Mobility:   Left Knee   Hypermobile: left medial and left lateral      Right Knee   Hypermobile: medial and lateral     Strength/Myotome Testing     Left Hip   Planes of Motion   Flexion: 4  Abduction: 4-  Adduction: 4    Right Hip   Planes of Motion   Flexion: 4  Abduction: 4-  Adduction: 4    Left Knee   Flexion: 4  Extension: 4  Quadriceps contraction: good    Right Knee   Flexion: 4-  Extension: 4-  Quadriceps contraction: good    Swelling     Left Knee Girth Measurement (cm)   Joint line: 41 cm    Right Knee Girth Measurement (cm)   Joint line: 41 cm    Ambulation   Weight-Bearing Status   Assistive device used: none    Ambulation: Level Surfaces   Ambulation without assistive device: independent    Ambulation: Stairs   Ascend stairs: independent  Pattern: reciprocal  Railings: one rail  Descend stairs: independent  Pattern: reciprocal  Railings: one rail    Observational Gait   Gait: antalgic   Walking speed within functional limits       General Comments:      Knee Comments  Measured with pants resting just above knee joints             Precautions:         Manuals 3/31 4/5  3/8 3/10 3/15 3/17 3/22 3/24 3/29   B/L knee ROM "4' quad off table PP  5' ea JK 8' JR  nt NP JR   4' quad off table                                          Neuro Re-Ed             KT tape R knee standard with pat tendon decompression B knees 8' NT  Medial off load & patella strap (U) 10' Medial off load & patella strap (U) 10' Both   knees  10' declined B knees 8' B knees 8' B knees 8'                                                                                 Ther Ex             Ball heel slides with quad set 20x 30x  20x 20x  20x 20x  20x 20x  20x   SLR 2# 2x10 2#  30x  20x ea 20x ea 2#/  2x10 2# 2x10  2# 2x10  2# 2x10 2# 2x10   SLR with ER 2# 2x10 2#  20x  10x ea 15x ea 2#/  2x10 2# 2x10  2# 2x10 2# 2x10 2# 2x10   Cybex hip add 35# 30x nt  5\"/20x 5''x20 CYBX  35#/  3x10 35# 30x 35# 30x 35# 30x 35# 30x   Cybex hip abd 35# 30x  nt  Blue TB 5\"/20x Blue tband 5''x20 CYBX  30#  10x  35#  2x10 35# 30x  35# 30x 35# 30x  35# 30x    Bridge  BTB 20x  20x   BTB 20x  BTB  20x BTB 20x  BTB 20x  BTB 20x BTB 20x   SAQ  3# 3\" 15x  20x  3\"/15  3\"/15 with ER 3\"/15  3\"/15 with ER 3x15\" 2# 3\"x15  2# 3''x15 w/ ER 2# 3\"15 w/ ER 2# 3\"15 w/ ER   Bike 10'  nt  L4 7' L4 10'  L4  10' L5 10'  L5 10'  Bike 10' Bike 10'    slant L3 30''x4   30\"x4 30''x4 4x 4x  4x  4x  4x    TB side stepping BTB 2 laps   RTB 20 ft x2 BTB 2 laps BTB  2 laps BTB 2 laps  BTB 2 laps  BTB 2 laps BTB 2 laps   LAQ VMO activation (ball between knees) 2# 2x10 ball  2#  20x  20x 20x  2#  2x10  ball 2# 2x10 ball  2# 2x10 ball  2# 2x10 ball  2# 2x10 ball    Wall sits to 45 deg 10x       5''x10 BTB  Hold                   Ther Activity                                       Gait Training                                       Modalities                                            "

## 2023-04-05 ENCOUNTER — EVALUATION (OUTPATIENT)
Dept: PHYSICAL THERAPY | Age: 58
End: 2023-04-05

## 2023-04-05 DIAGNOSIS — M25.562 PAIN IN BOTH KNEES, UNSPECIFIED CHRONICITY: Primary | ICD-10-CM

## 2023-04-05 DIAGNOSIS — M25.561 PAIN IN BOTH KNEES, UNSPECIFIED CHRONICITY: Primary | ICD-10-CM

## 2023-04-05 NOTE — LETTER
2023    Stanley Cody MD  [address]  LINDEN Whitfield Azoliver [address] [place] [id number]    Patient: Ronaldo Nichole   YOB: 1965   Date of Visit: 2023     Encounter Diagnosis     ICD-10-CM    1  Pain in both knees, unspecified chronicity  M25 561     M25 562           Dear Dr Regan Dhillon:    Thank you for your recent referral of Ronaldo Nichole  Please review the attached evaluation summary from Rosa's recent visit  Please verify that you agree with the plan of care by signing the attached order  If you have any questions or concerns, please do not hesitate to call  I sincerely appreciate the opportunity to share in the care of one of your patients and hope to have another opportunity to work with you in the near future  Sincerely,    Taylor Rahman, PT      Referring Provider:      I certify that I have read the below Plan of Care and certify the need for these services furnished under this plan of treatment while under my care  Stanley Cody MD  [address] [place] [id number]  Via Fax: [phone]          PT Re-Evaluation     Today's date: 2023  Patient name: Ronaldo Nichole  : 1965  MRN: [id number]  Referring provider: Zakiya Chester MD  Dx:   Encounter Diagnosis     ICD-10-CM    1  Pain in both knees, unspecified chronicity  M25 561     M25 562           Start Time: 1630  Stop Time: 1715  Total time in clinic (min): 45 minutes    Assessment  Assessment details: Ronaldo Nichole is a 61 y o  female who presented initially with bilateral knee pain, decreased  LE strength and decreased ROM with gait antalgia  Due to these impairments, Patient has difficulty performing a/iadls and recreational activities including weekly exercise program that she enjoys    Patient's clinical presentation is consistent with their referring diagnosis  Pain persists  She will have MRI testing   Patient could benefit from additional PT with goal lto transition to independent fitness and HEP over the next 3 weeks  Focus on stair negotiation and increased confidence with functional tasks  Impairments: abnormal or restricted ROM, activity intolerance, impaired physical strength, lacks appropriate home exercise program and pain with function  Understanding of Dx/Px/POC: good   Prognosis: good    Goals  ST-3 WEEKS - progressing towards  1  Decrease pain by 3-5  points on VAS at its worst   2   Increase ROM bilateral knee to [facility] without pain increase  - MET  3  Increase LE strength by 1/2 MMT grade in all deficient planes hip and knee musculature    LT-6 WEEKS  1  Patient to be independent with a/iadls with increased standing tolerance  2  Increase functional activities for leisure and home activities to previous LOF including return to gym program 3-5 days per week  3  Independent with HEP and/or fitness program   4  Improved confidence with stair and all surface ambulation with no antalgia  Plan  Patient would benefit from: skilled physical therapy  Planned modality interventions: cryotherapy, thermotherapy: hydrocollator packs and low level laser therapy  Planned therapy interventions: aquatic therapy, functional ROM exercises, home exercise program, joint mobilization, manual therapy, neuromuscular re-education, patient education, postural training, therapeutic activities and therapeutic exercise  Frequency: 2x per week  Duration in visits: 6  Duration in weeks: 3  Treatment plan discussed with: patient        Subjective Evaluation    History of Present Illness  Mechanism of injury: Left knee pain that started over one year ago; only remembers banging knee on door jamb  Over time right knee pain started that recently got progressively worse  Notes several weeks ago had sharp pain medial knee and felt a pop  (right knee)    Notes stairs challenging  Saw MD last year  No diagnostic testing at that time     Attends gym over the past several yearsfor total body "workout  Enjoys squats and intense exercise and is seeing a  one time per week  Missed classes because of knee or back pain  Left knee pain medial and superior knee  Worse with stairs  Notes feels like something is loose    Can be painful in multiple positions  Interrupted sleep  Some relief with Ibuprofen  23   Notes some relief with taping in PT as well as stretching  Pain continues with \"locking\" especially with prolonged positioning and while sleeping  Notes stair climbing remains difficult and requires rail  Reports lacks confidence in knee with fear of it giving out or locking  Seen by MD today and will have MRI testing  Quality of life: good    Pain  Current pain ratin  At best pain ratin  At worst pain ratin  Location: medial and inferior knees; right worse than left  Quality: throbbing and dull ache  Aggravating factors: standing, walking, stair climbing, nothing and sitting  Progression: no change    Social Support  Lives in: multiple-level home  Lives with: significant other    Exercise history: 5 days week at gym;    Treatments  Current treatment: medication  Patient Goals  Patient goals for therapy: decreased pain and return to sport/leisure activities  Patient goal: knees to be stronger; greater ease in stairs; trust knees more;        Objective     Tenderness   Left Knee   Tenderness in the medial joint line and patellar tendon  Right Knee   Tenderness in the medial joint line and patellar tendon       Neurological Testing     Sensation     Knee   Left Knee   Intact: light touch    Right Knee   Intact: light touch     Active Range of Motion   Left Knee   Flexion: 122 degrees   Extension: 0 degrees     Right Knee   Flexion: 124 degrees   Extension: 0 degrees with pain    Passive Range of Motion   Left Knee   Flexion: WFL  Extension: 0 degrees     Right Knee   Flexion: WFL  Extension: 0 degrees     Mobility   Patellar Mobility:   Left Knee   Hypermobile: left medial " "and left lateral      Right Knee   Hypermobile: medial and lateral     Strength/Myotome Testing     Left Hip   Planes of Motion   Flexion: 4  Abduction: 4-  Adduction: 4    Right Hip   Planes of Motion   Flexion: 4  Abduction: 4-  Adduction: 4    Left Knee   Flexion: 4  Extension: 4  Quadriceps contraction: good    Right Knee   Flexion: 4-  Extension: 4-  Quadriceps contraction: good    Swelling     Left Knee Girth Measurement (cm)   Joint line: 41 cm    Right Knee Girth Measurement (cm)   Joint line: 41 cm    Ambulation   Weight-Bearing Status   Assistive device used: none    Ambulation: Level Surfaces   Ambulation without assistive device: independent    Ambulation: Stairs   Ascend stairs: independent  Pattern: reciprocal  Railings: one rail  Descend stairs: independent  Pattern: reciprocal  Railings: one rail    Observational Gait   Gait: antalgic   Walking speed within functional limits       General Comments:      Knee Comments  Measured with pants resting just above knee joints            Precautions:         Manuals 3/31 4/5  3/8 3/10 3/15 3/17 3/22 3/24 3/29   B/L knee ROM 4' quad off table PP  5' ea JK 8' JR  nt NP JR   4' quad off table                                          Neuro Re-Ed             KT tape R knee standard with pat tendon decompression B knees 8' NT  Medial off load & patella strap (U) 10' Medial off load & patella strap (U) 10' Both   knees  10' declined B knees 8' B knees 8' B knees 8'                                                                                 Ther Ex             Ball heel slides with quad set 20x 30x  20x 20x  20x 20x  20x 20x  20x   SLR 2# 2x10 2#  30x  20x ea 20x ea 2#/  2x10 2# 2x10  2# 2x10  2# 2x10 2# 2x10   SLR with ER 2# 2x10 2#  20x  10x ea 15x ea 2#/  2x10 2# 2x10  2# 2x10 2# 2x10 2# 2x10   Cybex hip add 35# 30x nt  5\"/20x 5''x20 CYBX  35#/  3x10 35# 30x 35# 30x 35# 30x 35# 30x   Cybex hip abd 35# 30x  nt  Blue TB 5\"/20x Blue tband 5''x20 " "CYBX  30#  10x  35#  2x10 35# 30x  35# 30x 35# 30x  35# 30x    Bridge  BTB 20x  20x   BTB 20x  BTB  20x BTB 20x  BTB 20x  BTB 20x BTB 20x   SAQ  3# 3\" 15x  20x  3\"/15  3\"/15 with ER 3\"/15  3\"/15 with ER 3x15\" 2# 3\"x15  2# 3''x15 w/ ER 2# 3\"15 w/ ER 2# 3\"15 w/ ER   Bike 10'  nt  L4 7' L4 10'  L4  10' L5 10'  L5 10'  Bike 10' Bike 10'    slant L3 30''x4   30\"x4 30''x4 4x 4x  4x  4x  4x    TB side stepping BTB 2 laps   RTB 20 ft x2 BTB 2 laps BTB  2 laps BTB 2 laps  BTB 2 laps  BTB 2 laps BTB 2 laps   LAQ VMO activation (ball between knees) 2# 2x10 ball  2#  20x  20x 20x  2#  2x10  ball 2# 2x10 ball  2# 2x10 ball  2# 2x10 ball  2# 2x10 ball    Wall sits to 45 deg 10x       5''x10 BTB  Hold                   Ther Activity                                       Gait Training                                       Modalities                                                            "

## 2023-04-12 ENCOUNTER — APPOINTMENT (OUTPATIENT)
Dept: PHYSICAL THERAPY | Age: 58
End: 2023-04-12

## 2023-04-14 ENCOUNTER — APPOINTMENT (OUTPATIENT)
Dept: PHYSICAL THERAPY | Age: 58
End: 2023-04-14

## 2023-04-19 ENCOUNTER — APPOINTMENT (OUTPATIENT)
Dept: PHYSICAL THERAPY | Age: 58
End: 2023-04-19

## 2023-04-21 ENCOUNTER — APPOINTMENT (OUTPATIENT)
Dept: PHYSICAL THERAPY | Age: 58
End: 2023-04-21

## 2023-04-26 ENCOUNTER — APPOINTMENT (OUTPATIENT)
Dept: PHYSICAL THERAPY | Age: 58
End: 2023-04-26

## 2023-04-28 ENCOUNTER — APPOINTMENT (OUTPATIENT)
Dept: PHYSICAL THERAPY | Age: 58
End: 2023-04-28

## 2023-05-28 DIAGNOSIS — G43.711 INTRACTABLE CHRONIC MIGRAINE WITHOUT AURA AND WITH STATUS MIGRAINOSUS: ICD-10-CM

## 2023-05-30 RX ORDER — SUMATRIPTAN 100 MG/1
TABLET, FILM COATED ORAL
Qty: 9 TABLET | Refills: 3 | Status: SHIPPED | OUTPATIENT
Start: 2023-05-30

## 2023-06-13 ENCOUNTER — TELEPHONE (OUTPATIENT)
Dept: NEUROLOGY | Facility: CLINIC | Age: 58
End: 2023-06-13

## 2023-06-26 ENCOUNTER — APPOINTMENT (OUTPATIENT)
Dept: LAB | Facility: CLINIC | Age: 58
End: 2023-06-26
Payer: COMMERCIAL

## 2023-06-26 ENCOUNTER — OFFICE VISIT (OUTPATIENT)
Dept: NEUROLOGY | Facility: CLINIC | Age: 58
End: 2023-06-26
Payer: COMMERCIAL

## 2023-06-26 VITALS
DIASTOLIC BLOOD PRESSURE: 72 MMHG | SYSTOLIC BLOOD PRESSURE: 102 MMHG | OXYGEN SATURATION: 97 % | HEIGHT: 61 IN | BODY MASS INDEX: 27.94 KG/M2 | HEART RATE: 76 BPM | WEIGHT: 148 LBS

## 2023-06-26 DIAGNOSIS — G43.719 INTRACTABLE CHRONIC MIGRAINE WITHOUT AURA AND WITHOUT STATUS MIGRAINOSUS: Primary | ICD-10-CM

## 2023-06-26 DIAGNOSIS — G43.719 INTRACTABLE CHRONIC MIGRAINE WITHOUT AURA AND WITHOUT STATUS MIGRAINOSUS: ICD-10-CM

## 2023-06-26 LAB — B BURGDOR IGG+IGM SER-ACNC: <0.2 AI

## 2023-06-26 PROCEDURE — 86618 LYME DISEASE ANTIBODY: CPT

## 2023-06-26 PROCEDURE — 36415 COLL VENOUS BLD VENIPUNCTURE: CPT

## 2023-06-26 PROCEDURE — 99214 OFFICE O/P EST MOD 30 MIN: CPT | Performed by: PSYCHIATRY & NEUROLOGY

## 2023-06-26 NOTE — PROGRESS NOTES
Evelyn Shea is a 60 y o  female  Chief Complaint   Patient presents with   • Intractable chronic migraine without aura and without status       Assessment:  1  Intractable chronic migraine without aura and without status migrainosus        Plan:  Continue with Neurontin 100 mg at nighttime  Magnesium 400 mg a day  Riboflavin 200 mg a day  Imitrex 100 mg as needed headache maximum twice a week  Avoid migraine triggers  Check Lyme test   Follow-up with PCP regarding thyroid and other issues  Follow-up in 6 months  Discussion:  Patient with chronic migraine headaches, she has had a normal MRI scan of the brain in 2019 and was on Topamax that did not help her much she is currently on Neurontin 100 mg at nighttime and tells me that was doing well and was found to be hypothyroid and is on thyroid replacement, we discussed different options for her headaches including increasing gabapentin versus starting her on riboflavin 200 mg a day and with magnesium 400 mg a day side effects discussed with the patient she would like to try the magnesium and riboflavin and repeat thyroid function test and if still continues to have headaches she will call me and then we will increase the gabapentin to 200 mg at nighttime,    She was advised to avoid migraine triggers as we discussed in detail including foods to avoid to keep yourself well-hydrated to keep her blood pressure cholesterol and sugar under control, to go to the hospital if has any worsening symptoms and call me follow-up with family physician regarding her other medical issues and see me back in 6 months      Subjective:    HPI   Patient is here in follow-up for her history of chronic headaches, since her last visit she tells me that she has been doing reasonably okay she was started on thyroid medication for hypothyroidism she continues to have headaches at least 2 headaches a week they are mostly in the frontal head region associated with photophobia "phonophobia on an average about 5-9 on 10 relieved with lying in a dark room she is on gabapentin 100 mg at nighttime and is not having any side effects she was on magnesium and has stopped using it, her headaches last for couple of hours she has not had much relief with Topamax in the past, sleep is reasonably okay she does not wake up couple of times in the night does not snore appetite is good she has gained slight weight no vision difficulties no neck pain no other complaints  Vitals:    06/26/23 0824   BP: 102/72   BP Location: Left arm   Patient Position: Sitting   Cuff Size: Standard   Pulse: 76   SpO2: 97%   Weight: 67 1 kg (148 lb)   Height: 5' 1\" (1 549 m)       Current Medications    Current Outpatient Medications:   •  albuterol (PROVENTIL HFA,VENTOLIN HFA) 90 mcg/act inhaler, every 6 (six) hours as needed , Disp: , Rfl:   •  Ascorbic Acid (VITAMIN C) 1000 MG tablet, Take 1,000 mg by mouth daily, Disp: , Rfl:   •  Biotin 66690 MCG TABS, biotin, Disp: , Rfl:   •  Cholecalciferol 25 MCG (1000 UT) tablet, Take 1,000 Units by mouth daily, Disp: , Rfl:   •  gabapentin (Neurontin) 100 mg capsule, Take 1 capsule (100 mg total) by mouth daily at bedtime, Disp: 30 capsule, Rfl: 5  •  MAGNESIUM CITRATE PO, Take by mouth, Disp: , Rfl:   •  magnesium oxide (MAG-OX) 400 mg tablet, Take 400 mg by mouth daily, Disp: , Rfl:   •  SUMAtriptan (IMITREX) 100 mg tablet, TAKE 1 TABLET BY MOUTH ONCE AS NEEDED FOR MIGRAINE, Disp: 9 tablet, Rfl: 3  •  COLLAGEN PO, Take 10,000 mg by mouth, Disp: , Rfl:   •  milk thistle 175 MG tablet, Take 175 mg by mouth daily (Patient not taking: Reported on 6/26/2023), Disp: , Rfl:   •  topiramate (TOPAMAX) 25 mg tablet, TAKE 2 TABLETS (50 MG TOTAL) BY MOUTH DAILY AT BEDTIME, Disp: 60 tablet, Rfl: 6      Allergies  Patient has no known allergies  Past Medical History  Past Medical History:   Diagnosis Date   • Anemia    • Head injury     about age 14 ?    • Headache, tension-type  " Past Surgical History:  Past Surgical History:   Procedure Laterality Date   • CERVICAL BIOPSY  W/ LOOP ELECTRODE EXCISION     • NO PAST SURGERIES           Family History:  Family History   Problem Relation Age of Onset   • No Known Problems Mother    • Hypertension Father    • Sudden death Father    • Hypertension Brother    • Hypertension Brother    • Cancer Maternal Uncle        Social History:   reports that she has never smoked  She has never used smokeless tobacco  She reports that she does not drink alcohol and does not use drugs  I have reviewed the past medical history, surgical history, social and family history, current medications, allergies vitals, review of systems, and updated this information as appropriate today  Objective:    Physical Exam    Neurological Exam     GENERAL:  Cooperative in no acute distress  Well-developed and well-nourished     HEAD and NECK   Head is atraumatic normocephalic with no lesions or masses  Neck is supple with full range of motion     CARDIOVASCULAR  Carotid Arteries-no carotid bruits  NEUROLOGIC:  Mental Status-the patient is awake alert and oriented without aphasia or apraxia  Cranial Nerves: Visual fields are full to confrontation  Visual acuity is 20/20 with hand-held chart  Extraocular movements are full without nystagmus  Pupils are 2-1/2 mm and reactive  Face is symmetrical to light touch  Movements of facial expression move symmetrically  Hearing is normal to finger rub bilaterally  Soft palate lifts symmetrically  Shoulder shrug is symmetrical  Tongue is midline without atrophy  Motor: No drift is noted on arm extension  Strength is full in the upper and lower extremities with normal bulk and tone  Coordination: Finger to nose testing is performed accurately  Romberg is negative  Gait reveals a normal base with symmetrical arm swing   Tandem walk is normal   Reflexes: 2+ and symmetrical   No temporal artery tenderness, no cervical spine tenderness  ROS:  Review of Systems   Constitutional: Negative for appetite change, fatigue and fever  HENT: Negative  Negative for hearing loss, tinnitus, trouble swallowing and voice change  Eyes: Negative  Negative for photophobia, pain and visual disturbance  Respiratory: Negative  Negative for shortness of breath  Cardiovascular: Negative  Negative for palpitations  Gastrointestinal: Negative  Negative for nausea and vomiting  Endocrine: Negative  Negative for cold intolerance  Genitourinary: Negative  Negative for dysuria, frequency and urgency  Musculoskeletal: Negative for back pain, gait problem, myalgias and neck pain  Skin: Negative  Negative for rash  Allergic/Immunologic: Negative  Neurological: Positive for headaches  Negative for dizziness, tremors, seizures, syncope, facial asymmetry, speech difficulty, weakness, light-headedness and numbness  Hematological: Negative  Does not bruise/bleed easily  Psychiatric/Behavioral: Negative  Negative for confusion, hallucinations and sleep disturbance

## 2023-07-06 ENCOUNTER — APPOINTMENT (EMERGENCY)
Dept: RADIOLOGY | Facility: HOSPITAL | Age: 58
End: 2023-07-06
Payer: COMMERCIAL

## 2023-07-06 ENCOUNTER — HOSPITAL ENCOUNTER (EMERGENCY)
Facility: HOSPITAL | Age: 58
Discharge: HOME | End: 2023-07-06
Attending: EMERGENCY MEDICINE
Payer: COMMERCIAL

## 2023-07-06 ENCOUNTER — APPOINTMENT (EMERGENCY)
Dept: CT IMAGING | Facility: HOSPITAL | Age: 58
End: 2023-07-06
Payer: COMMERCIAL

## 2023-07-06 VITALS
HEART RATE: 68 BPM | DIASTOLIC BLOOD PRESSURE: 76 MMHG | RESPIRATION RATE: 16 BRPM | OXYGEN SATURATION: 98 % | SYSTOLIC BLOOD PRESSURE: 119 MMHG | TEMPERATURE: 97.9 F

## 2023-07-06 DIAGNOSIS — R42 LIGHTHEADED: ICD-10-CM

## 2023-07-06 DIAGNOSIS — R00.2 INTERMITTENT PALPITATIONS: Primary | ICD-10-CM

## 2023-07-06 LAB
2HR DELTA HS TROPONIN: 0 NG/L
ALBUMIN SERPL BCP-MCNC: 4.2 G/DL (ref 3.5–5)
ALP SERPL-CCNC: 60 U/L (ref 34–104)
ALT SERPL W P-5'-P-CCNC: 14 U/L (ref 7–52)
ANION GAP SERPL CALCULATED.3IONS-SCNC: 10 MMOL/L
AST SERPL W P-5'-P-CCNC: 17 U/L (ref 13–39)
ATRIAL RATE: 68 BPM
BASOPHILS # BLD AUTO: 0.03 THOUSANDS/ÂΜL (ref 0–0.1)
BASOPHILS NFR BLD AUTO: 1 % (ref 0–1)
BILIRUB SERPL-MCNC: 0.69 MG/DL (ref 0.2–1)
BILIRUB UR QL STRIP: NEGATIVE
BUN SERPL-MCNC: 16 MG/DL (ref 5–25)
CALCIUM SERPL-MCNC: 9.5 MG/DL (ref 8.4–10.2)
CARDIAC TROPONIN I PNL SERPL HS: 3 NG/L
CARDIAC TROPONIN I PNL SERPL HS: 3 NG/L
CHLORIDE SERPL-SCNC: 106 MMOL/L (ref 96–108)
CLARITY UR: CLEAR
CO2 SERPL-SCNC: 27 MMOL/L (ref 21–32)
COLOR UR: NORMAL
CREAT SERPL-MCNC: 0.92 MG/DL (ref 0.6–1.3)
D DIMER PPP FEU-MCNC: 0.95 UG/ML FEU
EOSINOPHIL # BLD AUTO: 0.06 THOUSAND/ÂΜL (ref 0–0.61)
EOSINOPHIL NFR BLD AUTO: 1 % (ref 0–6)
ERYTHROCYTE [DISTWIDTH] IN BLOOD BY AUTOMATED COUNT: 12.2 % (ref 11.6–15.1)
GFR SERPL CREATININE-BSD FRML MDRD: 68 ML/MIN/1.73SQ M
GLUCOSE SERPL-MCNC: 92 MG/DL (ref 65–140)
GLUCOSE UR STRIP-MCNC: NEGATIVE MG/DL
HCT VFR BLD AUTO: 41.7 % (ref 34.8–46.1)
HGB BLD-MCNC: 14.3 G/DL (ref 11.5–15.4)
HGB UR QL STRIP.AUTO: NEGATIVE
IMM GRANULOCYTES # BLD AUTO: 0.01 THOUSAND/UL (ref 0–0.2)
IMM GRANULOCYTES NFR BLD AUTO: 0 % (ref 0–2)
KETONES UR STRIP-MCNC: NEGATIVE MG/DL
LEUKOCYTE ESTERASE UR QL STRIP: NEGATIVE
LYMPHOCYTES # BLD AUTO: 1.59 THOUSANDS/ÂΜL (ref 0.6–4.47)
LYMPHOCYTES NFR BLD AUTO: 28 % (ref 14–44)
MAGNESIUM SERPL-MCNC: 2.2 MG/DL (ref 1.9–2.7)
MCH RBC QN AUTO: 29.2 PG (ref 26.8–34.3)
MCHC RBC AUTO-ENTMCNC: 34.3 G/DL (ref 31.4–37.4)
MCV RBC AUTO: 85 FL (ref 82–98)
MONOCYTES # BLD AUTO: 0.41 THOUSAND/ÂΜL (ref 0.17–1.22)
MONOCYTES NFR BLD AUTO: 7 % (ref 4–12)
NEUTROPHILS # BLD AUTO: 3.68 THOUSANDS/ÂΜL (ref 1.85–7.62)
NEUTS SEG NFR BLD AUTO: 63 % (ref 43–75)
NITRITE UR QL STRIP: NEGATIVE
NRBC BLD AUTO-RTO: 0 /100 WBCS
P AXIS: 75 DEGREES
PH UR STRIP.AUTO: 6 [PH]
PLATELET # BLD AUTO: 276 THOUSANDS/UL (ref 149–390)
PMV BLD AUTO: 10 FL (ref 8.9–12.7)
POTASSIUM SERPL-SCNC: 3.5 MMOL/L (ref 3.5–5.3)
PR INTERVAL: 132 MS
PROT SERPL-MCNC: 7 G/DL (ref 6.4–8.4)
PROT UR STRIP-MCNC: NEGATIVE MG/DL
QRS AXIS: 61 DEGREES
QRSD INTERVAL: 70 MS
QT INTERVAL: 394 MS
QTC INTERVAL: 418 MS
RBC # BLD AUTO: 4.89 MILLION/UL (ref 3.81–5.12)
SODIUM SERPL-SCNC: 143 MMOL/L (ref 135–147)
SP GR UR STRIP.AUTO: 1.01 (ref 1–1.03)
T WAVE AXIS: 79 DEGREES
TSH SERPL DL<=0.05 MIU/L-ACNC: 0.87 UIU/ML (ref 0.45–4.5)
UROBILINOGEN UR STRIP-ACNC: <2 MG/DL
VENTRICULAR RATE: 68 BPM
WBC # BLD AUTO: 5.78 THOUSAND/UL (ref 4.31–10.16)

## 2023-07-06 PROCEDURE — 85025 COMPLETE CBC W/AUTO DIFF WBC: CPT | Performed by: EMERGENCY MEDICINE

## 2023-07-06 PROCEDURE — 36415 COLL VENOUS BLD VENIPUNCTURE: CPT | Performed by: EMERGENCY MEDICINE

## 2023-07-06 PROCEDURE — 81003 URINALYSIS AUTO W/O SCOPE: CPT | Performed by: EMERGENCY MEDICINE

## 2023-07-06 PROCEDURE — 71275 CT ANGIOGRAPHY CHEST: CPT

## 2023-07-06 PROCEDURE — 80053 COMPREHEN METABOLIC PANEL: CPT | Performed by: EMERGENCY MEDICINE

## 2023-07-06 PROCEDURE — 99285 EMERGENCY DEPT VISIT HI MDM: CPT

## 2023-07-06 PROCEDURE — 83735 ASSAY OF MAGNESIUM: CPT | Performed by: EMERGENCY MEDICINE

## 2023-07-06 PROCEDURE — 85379 FIBRIN DEGRADATION QUANT: CPT | Performed by: EMERGENCY MEDICINE

## 2023-07-06 PROCEDURE — 71046 X-RAY EXAM CHEST 2 VIEWS: CPT

## 2023-07-06 PROCEDURE — 84484 ASSAY OF TROPONIN QUANT: CPT | Performed by: EMERGENCY MEDICINE

## 2023-07-06 PROCEDURE — 84443 ASSAY THYROID STIM HORMONE: CPT | Performed by: EMERGENCY MEDICINE

## 2023-07-06 PROCEDURE — 96360 HYDRATION IV INFUSION INIT: CPT

## 2023-07-06 PROCEDURE — 93005 ELECTROCARDIOGRAM TRACING: CPT

## 2023-07-06 PROCEDURE — 93010 ELECTROCARDIOGRAM REPORT: CPT | Performed by: INTERNAL MEDICINE

## 2023-07-06 RX ADMIN — SODIUM CHLORIDE 1000 ML: 0.9 INJECTION, SOLUTION INTRAVENOUS at 13:11

## 2023-07-06 RX ADMIN — IOHEXOL 100 ML: 350 INJECTION, SOLUTION INTRAVENOUS at 15:05

## 2023-07-06 NOTE — ED PROVIDER NOTES
History  Chief Complaint   Patient presents with   • Palpitations     Pt with c/o palpitations, SOB and dizziness for about 3-4 weeks. Pt recently started taking thyroid medication, unsure if it is a reaction from that. History provided by:  Patient  Palpitations  Palpitations quality:  Fast  Onset quality:  Gradual  Duration:  4 weeks  Timing:  Intermittent  Progression:  Waxing and waning  Chronicity:  New  Relieved by:  Nothing  Worsened by:  Caffeine  Ineffective treatments:  None tried  Associated symptoms: chest pain, diaphoresis, dizziness and shortness of breath    Associated symptoms: no cough, no leg pain, no lower extremity edema, no malaise/fatigue, no nausea, no near-syncope, no orthopnea, no syncope, no vomiting and no weakness        Prior to Admission Medications   Prescriptions Last Dose Informant Patient Reported? Taking?    Ascorbic Acid (VITAMIN C) 1000 MG tablet  Self Yes No   Sig: Take 1,000 mg by mouth daily   Biotin 12965 MCG TABS  Self Yes No   Sig: biotin   COLLAGEN PO  Self Yes No   Sig: Take 10,000 mg by mouth   Cholecalciferol 25 MCG (1000 UT) tablet  Self Yes No   Sig: Take 1,000 Units by mouth daily   MAGNESIUM CITRATE PO  Self Yes No   Sig: Take by mouth   SUMAtriptan (IMITREX) 100 mg tablet  Self No No   Sig: TAKE 1 TABLET BY MOUTH ONCE AS NEEDED FOR MIGRAINE   albuterol (PROVENTIL HFA,VENTOLIN HFA) 90 mcg/act inhaler  Self Yes No   Sig: every 6 (six) hours as needed    gabapentin (Neurontin) 100 mg capsule  Self No No   Sig: Take 1 capsule (100 mg total) by mouth daily at bedtime   magnesium oxide (MAG-OX) 400 mg tablet  Self Yes No   Sig: Take 400 mg by mouth daily   milk thistle 175 MG tablet  Self Yes No   Sig: Take 175 mg by mouth daily   Patient not taking: Reported on 6/26/2023   topiramate (TOPAMAX) 25 mg tablet  Self No No   Sig: TAKE 2 TABLETS (50 MG TOTAL) BY MOUTH DAILY AT BEDTIME      Facility-Administered Medications: None       Past Medical History:   Diagnosis Date   • Anemia    • Disease of thyroid gland    • Head injury     about age 14 ? • Headache, tension-type        Past Surgical History:   Procedure Laterality Date   • CERVICAL BIOPSY  W/ LOOP ELECTRODE EXCISION     • NO PAST SURGERIES         Family History   Problem Relation Age of Onset   • No Known Problems Mother    • Hypertension Father    • Sudden death Father    • Hypertension Brother    • Hypertension Brother    • Cancer Maternal Uncle      I have reviewed and agree with the history as documented. E-Cigarette/Vaping   • E-Cigarette Use Never User      E-Cigarette/Vaping Substances   • Nicotine No    • THC No    • CBD No    • Flavoring No    • Other No    • Unknown No      Social History     Tobacco Use   • Smoking status: Never   • Smokeless tobacco: Never   Vaping Use   • Vaping Use: Never used   Substance Use Topics   • Alcohol use: No   • Drug use: No       Review of Systems   Constitutional: Positive for diaphoresis. Negative for malaise/fatigue. Respiratory: Positive for shortness of breath. Negative for cough. Cardiovascular: Positive for chest pain. Negative for orthopnea, syncope and near-syncope. Gastrointestinal: Negative for nausea and vomiting. Neurological: Positive for dizziness. Negative for weakness. All other systems reviewed and are negative. Physical Exam  Physical Exam  Vitals and nursing note reviewed. Constitutional:       General: She is not in acute distress. Appearance: She is well-developed. She is not ill-appearing or diaphoretic. HENT:      Head: Normocephalic and atraumatic. Nose: Nose normal.      Mouth/Throat:      Mouth: Mucous membranes are moist.   Eyes:      Conjunctiva/sclera: Conjunctivae normal.   Cardiovascular:      Rate and Rhythm: Normal rate and regular rhythm. Heart sounds: Normal heart sounds. Pulmonary:      Effort: Pulmonary effort is normal. No respiratory distress. Breath sounds: Normal breath sounds. Abdominal:      General: There is no distension. Palpations: Abdomen is soft. Tenderness: There is no abdominal tenderness. Musculoskeletal:         General: Normal range of motion. Cervical back: Normal range of motion and neck supple. Skin:     General: Skin is warm and dry. Neurological:      General: No focal deficit present. Mental Status: She is alert and oriented to person, place, and time. Mental status is at baseline. Cranial Nerves: No cranial nerve deficit.          Vital Signs  ED Triage Vitals   Temperature Pulse Respirations Blood Pressure SpO2   07/06/23 1230 07/06/23 1230 07/06/23 1230 07/06/23 1230 07/06/23 1230   97.9 °F (36.6 °C) 63 19 127/78 100 %      Temp Source Heart Rate Source Patient Position - Orthostatic VS BP Location FiO2 (%)   07/06/23 1230 07/06/23 1230 07/06/23 1230 07/06/23 1230 --   Tympanic Monitor Sitting Left arm       Pain Score       07/06/23 1523       5           Vitals:    07/06/23 1230 07/06/23 1428 07/06/23 1523   BP: 127/78 121/68 119/76   Pulse: 63 62 68   Patient Position - Orthostatic VS: Sitting Lying Lying         Visual Acuity      ED Medications  Medications   sodium chloride 0.9 % bolus 1,000 mL (0 mL Intravenous Stopped 7/6/23 1428)   iohexol (OMNIPAQUE) 350 MG/ML injection (SINGLE-DOSE) 100 mL (100 mL Intravenous Given 7/6/23 1505)       Diagnostic Studies  Results Reviewed     Procedure Component Value Units Date/Time    HS Troponin I 2hr [[id number]]  (Normal) Collected: 07/06/23 1534    Lab Status: Final result Specimen: Blood from Arm, Left Updated: 07/06/23 1614     hs TnI 2hr 3 ng/L      Delta 2hr hsTnI 0 ng/L     TSH, 3rd generation with Free T4 reflex [[id number]]  (Normal) Collected: 07/06/23 1312    Lab Status: Final result Specimen: Blood from Arm, Left Updated: 07/06/23 1406     TSH 3RD GENERATON 0.871 uIU/mL     Comprehensive metabolic panel [[id number]] Collected: 07/06/23 1312    Lab Status: Final result Specimen: Blood from Arm, Left Updated: 07/06/23 1355     Sodium 143 mmol/L      Potassium 3.5 mmol/L      Chloride 106 mmol/L      CO2 27 mmol/L      ANION GAP 10 mmol/L      BUN 16 mg/dL      Creatinine 0.92 mg/dL      Glucose 92 mg/dL      Calcium 9.5 mg/dL      AST 17 U/L      ALT 14 U/L      Alkaline Phosphatase 60 U/L      Total Protein 7.0 g/dL      Albumin 4.2 g/dL      Total Bilirubin 0.69 mg/dL      eGFR 68 ml/min/1.73sq m     Narrative:      [place] guidelines for Chronic Kidney Disease (CKD):   •  Stage 1 with normal or high GFR (GFR > 90 mL/min/1.73 square meters)  •  Stage 2 Mild CKD (GFR = 60-89 mL/min/1.73 square meters)  •  Stage 3A Moderate CKD (GFR = 45-59 mL/min/1.73 square meters)  •  Stage 3B Moderate CKD (GFR = 30-44 mL/min/1.73 square meters)  •  Stage 4 Severe CKD (GFR = 15-29 mL/min/1.73 square meters)  •  Stage 5 End Stage CKD (GFR <15 mL/min/1.73 square meters)  Note: GFR calculation is accurate only with a steady state creatinine    Magnesium [[id number]]  (Normal) Collected: 07/06/23 1312    Lab Status: Final result Specimen: Blood from Arm, Left Updated: 07/06/23 1355     Magnesium 2.2 mg/dL     HS Troponin 0hr (reflex protocol) [[id number]]  (Normal) Collected: 07/06/23 1312    Lab Status: Final result Specimen: Blood from Arm, Left Updated: 07/06/23 1352     hs TnI 0hr 3 ng/L     D-Dimer [[id number]]  (Abnormal) Collected: 07/06/23 1312    Lab Status: Final result Specimen: Blood from Arm, Left Updated: 07/06/23 1338     D-Dimer, Quant 0.95 ug/ml FEU     Narrative: In the evaluation for possible pulmonary embolism, in the appropriate (Well's Score of 4 or less) patient, the age adjusted d-dimer cutoff for this patient can be calculated as:    Age x 0.01 (in ug/mL) for Age-adjusted D-dimer exclusion threshold for a patient over 50 years.     UA w Reflex to Microscopic w Reflex to Culture [[id number]] Collected: 07/06/23 1312    Lab Status: Final result Specimen: Urine, Clean Catch Updated: 07/06/23 1324     Color, UA Light Yellow     Clarity, UA Clear     Specific Gravity, UA 1.012     pH, UA 6.0     Leukocytes, UA Negative     Nitrite, UA Negative     Protein, UA Negative mg/dl      Glucose, UA Negative mg/dl      Ketones, UA Negative mg/dl      Urobilinogen, UA <2.0 mg/dl      Bilirubin, UA Negative     Occult Blood, UA Negative    CBC and differential [[id number]] Collected: 07/06/23 1312    Lab Status: Final result Specimen: Blood from Arm, Left Updated: 07/06/23 1322     WBC 5.78 Thousand/uL      RBC 4.89 Million/uL      Hemoglobin 14.3 g/dL      Hematocrit 41.7 %      MCV 85 fL      MCH 29.2 pg      MCHC 34.3 g/dL      RDW 12.2 %      MPV 10.0 fL      Platelets 710 Thousands/uL      nRBC 0 /100 WBCs      Neutrophils Relative 63 %      Immat GRANS % 0 %      Lymphocytes Relative 28 %      Monocytes Relative 7 %      Eosinophils Relative 1 %      Basophils Relative 1 %      Neutrophils Absolute 3.68 Thousands/µL      Immature Grans Absolute 0.01 Thousand/uL      Lymphocytes Absolute 1.59 Thousands/µL      Monocytes Absolute 0.41 Thousand/µL      Eosinophils Absolute 0.06 Thousand/µL      Basophils Absolute 0.03 Thousands/µL                  CTA ED chest PE study   Final Result by Melissa Acuna MD (07/06 1541)      No pulmonary embolus. No acute pulmonary disease. Workstation performed: [id number]         XR chest 2 views   Final Result by Mike Royal MD (07/06 1350)      No acute cardiopulmonary disease.                   Workstation performed: [id number]                    Procedures  ECG 12 Lead Documentation Only    Date/Time: 7/6/2023 12:58 PM    Performed by: Patricio Boateng MD  Authorized by: Patricio Boateng MD    Indications / Diagnosis:  Palpiations  ECG reviewed by me, the ED Provider: yes    Patient location:  ED  Rate:     ECG rate:  68    ECG rate assessment: normal    Rhythm:     Rhythm: sinus rhythm    ST segments: ST segments:  Normal  T waves:     T waves: normal               ED Course                                             Medical Decision Making  63-year female is coming with complaint of intermittent palpitations with periods of lightheadedness and complaint of some mild chest discomfort when she gets the palpitations and feeling like she needs to catch her breath or take a deep breath that have gradually happened over the past few weeks and became more acute over the past few days and had a worse episode today before coming in. Patient was just recently started on a thyroid medication and because she was told her thyroid was low. Thought it may be related to some caffeine use. She will get lightheaded if she changes position or moves her head too quickly. It is not vertiginous, it is not room spinning or off-balance it feels more presyncopal.  She has no vision changes speech change numbness or weakness or any neurologic type symptoms. Given new palpitations and lightheadedness with some mild chest discomfort we will evaluate with EKG for arrhythmia and ischemic changes. We will get chest x-ray for his pneumonia or pneumothorax. We will get CBC, count, serial troponins D-dimer to rule out PE will get repeat TSH with T4 since patient was due to get another level to compare to prior since being started on thyroid medication. We will keep on cardiac monitor to see if patient has any arrhythmias like A-fib versus a flutter versus PVCs or PACs or even SVT. Intermittent palpitations: acute illness or injury  Lightheaded: acute illness or injury  Amount and/or Complexity of Data Reviewed  Labs: ordered. Radiology: ordered. ECG/medicine tests: ordered and independent interpretation performed. Risk  Prescription drug management.           Disposition  Final diagnoses:   Intermittent palpitations   Lightheaded     Time reflects when diagnosis was documented in both MDM as applicable and the Disposition within this note     Time User Action Codes Description Comment    7/6/2023  4:25 PM Peng Lazcano Add [R00.2] Palpitations     7/6/2023  4:25 PM Millie Montano [R00.2] Palpitations     7/6/2023  4:25 PM [place], [address] [R00.2] Intermittent palpitations     7/6/2023  4:25 PM Gabriele ROSALES Add [R42] Lightheaded       ED Disposition     ED Disposition   Discharge    Condition   Stable    Date/Time   Thu Jul 6, 2023  4:25 PM    Comment   García Conteh discharge to home/self care. Follow-up Information     Follow up With Specialties Details Why Contact Info    Crystal Royal MD Family Medicine   [address] [place] [address] [address] [place]  [phone]            Discharge Medication List as of 7/6/2023  4:26 PM      CONTINUE these medications which have NOT CHANGED    Details   albuterol (PROVENTIL HFA,VENTOLIN HFA) 90 mcg/act inhaler every 6 (six) hours as needed , Historical Med      Ascorbic Acid (VITAMIN C) 1000 MG tablet Take 1,000 mg by mouth daily, Historical Med      Biotin 39316 MCG TABS biotin, Historical Med      Cholecalciferol 25 MCG (1000 UT) tablet Take 1,000 Units by mouth daily, Historical Med      COLLAGEN PO Take 10,000 mg by mouth, Historical Med      gabapentin (Neurontin) 100 mg capsule Take 1 capsule (100 mg total) by mouth daily at bedtime, Starting Tue 9/6/2022, Normal      MAGNESIUM CITRATE PO Take by mouth, Historical Med      magnesium oxide (MAG-OX) 400 mg tablet Take 400 mg by mouth daily, Historical Med      milk thistle 175 MG tablet Take 175 mg by mouth daily, Historical Med      SUMAtriptan (IMITREX) 100 mg tablet TAKE 1 TABLET BY MOUTH ONCE AS NEEDED FOR MIGRAINE, Normal      topiramate (TOPAMAX) 25 mg tablet TAKE 2 TABLETS (50 MG TOTAL) BY MOUTH DAILY AT BEDTIME, Starting Fri 6/3/2022, Normal             No discharge procedures on file.     PDMP Review     None          ED Provider  Electronically Signed by           Roseanna Benavides MD  07/08/23 0428

## 2023-07-10 ENCOUNTER — EVALUATION (OUTPATIENT)
Dept: PHYSICAL THERAPY | Age: 58
End: 2023-07-10
Payer: COMMERCIAL

## 2023-07-10 DIAGNOSIS — M54.16 LUMBAR RADICULOPATHY: Primary | ICD-10-CM

## 2023-07-10 PROCEDURE — 97110 THERAPEUTIC EXERCISES: CPT | Performed by: PHYSICAL THERAPIST

## 2023-07-10 PROCEDURE — 97140 MANUAL THERAPY 1/> REGIONS: CPT | Performed by: PHYSICAL THERAPIST

## 2023-07-10 PROCEDURE — 97162 PT EVAL MOD COMPLEX 30 MIN: CPT | Performed by: PHYSICAL THERAPIST

## 2023-07-10 NOTE — PROGRESS NOTES
PT Evaluation     Today's date: 2023  Patient name: Ashlyn Elizabeth  : 1965  MRN: [id number]  Referring provider: Jacob Richter MD  Dx:   Encounter Diagnosis     ICD-10-CM    1. Lumbar radiculopathy  M54.16           Start Time: 1100  Stop Time: 1043  Total time in clinic (min): 59 minutes    Assessment  Assessment details: Ashlyn Elizabeth is a 60 y.o. female who presents with pain, decreased strength and decreased ROM. Due to these impairments, Patient has difficulty performing a/iadls and recreational activities. Patient's clinical presentation is consistent with their referring diagnosis of chronic low back pain Patient instructed in HEP: hamstring stretches, single knee to chest and piriformis stretch. . Patient would benefit from skilled physical therapy to address their aforementioned impairments, improve their level of function and to improve their overall quality of life. Impairments: abnormal or restricted ROM, activity intolerance, impaired physical strength, lacks appropriate home exercise program, pain with function, poor posture  and poor body mechanics    Symptom irritability: moderateUnderstanding of Dx/Px/POC: good   Prognosis: good    Goals  ST-3 WEEKS  1. Decrease pain < 7/10 on VAS at its worst.  2.  Increase ROM by > 5 deg in all deficients planes. 3.  Increase core strength by 1/2 MMT grade. 4. Decrease radicular symptoms LLE by 50%    LT-6 WEEKS  1. Patient to be independent with a/iadls. 2. Increase functional activities for leisure and home activities to previous LOF.   3. Independent with HEP and/or fitness program.    Plan  Patient would benefit from: skilled physical therapy  Planned modality interventions: cryotherapy, thermotherapy: hydrocollator packs, unattended electrical stimulation and low level laser therapy  Planned therapy interventions: activity modification, behavior modification, body mechanics training, aquatic therapy, flexibility, functional ROM exercises, home exercise program, IADL retraining, joint mobilization, manual therapy, neuromuscular re-education, patient education, postural training, strengthening, stretching, therapeutic activities, therapeutic exercise and abdominal trunk stabilization  Frequency: 2-3x week. Duration in weeks: 12  Plan of Care beginning date: 7/10/2023  Plan of Care expiration date: 10/10/2023  Treatment plan discussed with: patient        Subjective Evaluation    History of Present Illness  Mechanism of injury: Patient has long history of back pain. She had MRI in 2018 on LB and had recent falls that aggravated her symptoms. She saw MD for follow up with her knees and mentioned her back pain. She was referred to [facility]. Recurrent probem    Pain  Current pain ratin  At worst pain ratin  Location: LB  Quality: dull ache, tight, sharp, radiating and pulling  Relieving factors: change in position and rest  Progression: no change    Social Support  Stairs in house: yes   Lives in: multiple-level home  Lives with: significant other    Employment status: working (realator)    Diagnostic Tests  No diagnostic tests performed    FCE comments: MRI from 2018 in chartPatient Goals  Patient goals for therapy: decreased pain, increased strength and increased motion  Patient goal: move better, no radicular pain        Objective     Postural Observations  Seated posture: good  Standing posture: good        Tenderness     Left Hip   Tenderness in the PSIS. Right Hip   Tenderness in the PSIS. Neurological Testing     Sensation     Lumbar   Left   Intact: light touch    Right   Intact: light touch    Reflexes   Left   Patellar (L4): normal (2+)    Right   Patellar (L4): normal (2+)    Active Range of Motion     Lumbar   Flexion: Active lumbar flexion: tips to ankles.    Extension: 15 degrees  with pain  Left lateral flexion:  WFL  Right lateral flexion:  [facility]    Joint Play   Joints within functional limits: L1, L2, L3, L4 and L5 Pain: L4 and L5   Mechanical Assessment    Cervical      Thoracic      Lumbar    Standing extension: repeated movements  Pain location: no change  painful    Strength/Myotome Testing     Left Hip   Planes of Motion   Flexion: 4+  Extension: 4+  Abduction: 4+  Adduction: 5    Right Hip   Planes of Motion   Flexion: 4+  Extension: 4+  Abduction: 4+  Adduction: 5    Left Knee   Flexion: 4+  Extension: 5    Right Knee   Flexion: 4+  Extension: 5    Left Ankle/Foot   Dorsiflexion: 5  Plantar flexion: 5    Right Ankle/Foot   Dorsiflexion: 5  Plantar flexion: 5    Tests     Lumbar   Positive SIJ compression. Negative sacroiliac distraction. Left   Negative passive SLR and slump test.     Right   Negative passive SLR and slump test.     Left Hip   Negative GABBY and FADIR. Right Hip   Negative GABBY and FADIR. Ambulation   Weight-Bearing Status   Assistive device used: none    Ambulation: Level Surfaces   Ambulation without assistive device: independent    Ambulation: Stairs   Pattern: non-reciprocal  Railings: one rail  Pattern: non-reciprocal  Railings: one rail    Additional Stairs Ambulation Details  Due to B knee pain    General Comments:    Lower quarter screen   Hips: unremarkable    Knee Comments  C/o bilateral knee pain  ROM is WFL for ambulation and ADL's    Ankle/Foot Comments   C/o numbness dorsal left foot in sitting.  Weight bearing relieves symptoms      Flowsheet Rows    Flowsheet Row Most Recent Value   PT/OT G-Codes    Current Score 57   Projected Score 70             Precautions: standard    Daily Treatment Diary:    Manuals 7/10            LB/LE's 10                                                   Neuro Re-Ed                                                    Ther Ex             HEP 5'            B heel slides 30x            bridges 20x            Stand ball press 5" 20x            Hip abduction             Hip adduction             Slant board             Nustep for strength 5' L4 multifidus                                                    Ther Activity                                       Gait Training                                       Modalities

## 2023-07-10 NOTE — LETTER
July 10, 2023    Sabina Ashford MD  [address]   [facility] [address]    Patient: Tim Rojas   YOB: 1965   Date of Visit: 7/10/2023     Encounter Diagnosis     ICD-10-CM    1. Lumbar radiculopathy  M54.16           Dear Dr. Michael Martin:    Thank you for your recent referral of Tim Rojas. Please review the attached evaluation summary from Rosa's recent visit. Please verify that you agree with the plan of care by signing the attached order. If you have any questions or concerns, please do not hesitate to call. I sincerely appreciate the opportunity to share in the care of one of your patients and hope to have another opportunity to work with you in the near future. Sincerely,    Jadiel Siegel, PT      Referring Provider:      I certify that I have read the below Plan of Care and certify the need for these services furnished under this plan of treatment while under my care. Sabina Ashford MD  [address] [address]  Via Fax: [phone]          PT Evaluation     Today's date: 7/10/2023  Patient name: Tim Rojas  : 1965  MRN: [id number]  Referring provider: Sabina Ashford MD  Dx:   Encounter Diagnosis     ICD-10-CM    1. Lumbar radiculopathy  M54.16           Start Time: 1100  Stop Time: 8494  Total time in clinic (min): 59 minutes    Assessment  Assessment details: Tim Rojas is a 60 y.o. female who presents with pain, decreased strength and decreased ROM. Due to these impairments, Patient has difficulty performing a/iadls and recreational activities. Patient's clinical presentation is consistent with their referring diagnosis of chronic low back pain Patient instructed in HEP: hamstring stretches, single knee to chest and piriformis stretch. . Patient would benefit from skilled physical therapy to address their aforementioned impairments, improve their level of function and to improve their overall quality of life.  Impairments: abnormal or restricted ROM, activity intolerance, impaired physical strength, lacks appropriate home exercise program, pain with function, poor posture  and poor body mechanics    Symptom irritability: moderateUnderstanding of Dx/Px/POC: good   Prognosis: good    Goals  ST-3 WEEKS  1. Decrease pain < 7/10 on VAS at its worst.  2.  Increase ROM by > 5 deg in all deficients planes. 3.  Increase core strength by 1/2 MMT grade. 4. Decrease radicular symptoms LLE by 50%    LT-6 WEEKS  1. Patient to be independent with a/iadls. 2. Increase functional activities for leisure and home activities to previous LOF. 3. Independent with HEP and/or fitness program.    Plan  Patient would benefit from: skilled physical therapy  Planned modality interventions: cryotherapy, thermotherapy: hydrocollator packs, unattended electrical stimulation and low level laser therapy  Planned therapy interventions: activity modification, behavior modification, body mechanics training, aquatic therapy, flexibility, functional ROM exercises, home exercise program, IADL retraining, joint mobilization, manual therapy, neuromuscular re-education, patient education, postural training, strengthening, stretching, therapeutic activities, therapeutic exercise and abdominal trunk stabilization  Frequency: 2-3x week. Duration in weeks: 12  Plan of Care beginning date: 7/10/2023  Plan of Care expiration date: 10/10/2023  Treatment plan discussed with: patient        Subjective Evaluation    History of Present Illness  Mechanism of injury: Patient has long history of back pain. She had MRI in 2018 on LB and had recent falls that aggravated her symptoms. She saw MD for follow up with her knees and mentioned her back pain. She was referred to [facility].            Recurrent probem    Pain  Current pain ratin  At worst pain ratin  Location: LB  Quality: dull ache, tight, sharp, radiating and pulling  Relieving factors: change in position and rest  Progression: no change    Social Support  Stairs in house: yes   Lives in: multiple-level home  Lives with: significant other    Employment status: working (realator)    Diagnostic Tests  No diagnostic tests performed    FCE comments: MRI from 2018 in chartPatient Goals  Patient goals for therapy: decreased pain, increased strength and increased motion  Patient goal: move better, no radicular pain        Objective     Postural Observations  Seated posture: good  Standing posture: good        Tenderness     Left Hip   Tenderness in the PSIS. Right Hip   Tenderness in the PSIS. Neurological Testing     Sensation     Lumbar   Left   Intact: light touch    Right   Intact: light touch    Reflexes   Left   Patellar (L4): normal (2+)    Right   Patellar (L4): normal (2+)    Active Range of Motion     Lumbar   Flexion: Active lumbar flexion: tips to ankles. Extension: 15 degrees  with pain  Left lateral flexion:  WFL  Right lateral flexion:  [facility]    Joint Play   Joints within functional limits: L1, L2, L3, L4 and L5     Pain: L4 and L5   Mechanical Assessment    Cervical      Thoracic      Lumbar    Standing extension: repeated movements  Pain location: no change  painful    Strength/Myotome Testing     Left Hip   Planes of Motion   Flexion: 4+  Extension: 4+  Abduction: 4+  Adduction: 5    Right Hip   Planes of Motion   Flexion: 4+  Extension: 4+  Abduction: 4+  Adduction: 5    Left Knee   Flexion: 4+  Extension: 5    Right Knee   Flexion: 4+  Extension: 5    Left Ankle/Foot   Dorsiflexion: 5  Plantar flexion: 5    Right Ankle/Foot   Dorsiflexion: 5  Plantar flexion: 5    Tests     Lumbar   Positive SIJ compression. Negative sacroiliac distraction. Left   Negative passive SLR and slump test.     Right   Negative passive SLR and slump test.     Left Hip   Negative GABBY and FADIR. Right Hip   Negative GABBY and FADIR.      Ambulation   Weight-Bearing Status   Assistive device used: none    Ambulation: Level Surfaces   Ambulation without assistive device: independent    Ambulation: Stairs   Pattern: non-reciprocal  Railings: one rail  Pattern: non-reciprocal  Railings: one rail    Additional Stairs Ambulation Details  Due to B knee pain    General Comments:    Lower quarter screen   Hips: unremarkable    Knee Comments  C/o bilateral knee pain  ROM is WFL for ambulation and ADL's    Ankle/Foot Comments   C/o numbness dorsal left foot in sitting.  Weight bearing relieves symptoms      Flowsheet Rows    Flowsheet Row Most Recent Value   PT/OT G-Codes    Current Score 57   Projected Score 70            Precautions:     Daily Treatment Diary:    Manuals                                                                 Neuro Re-Ed                                                    Ther Ex             HEP 5'            B heel slides 30x            bridges 20x            Stand ball press 5" 20x            Hip abduction             Hip adduction             Slant board             Nustep for strength 5' L4            multifidus                                                    Ther Activity                                       Gait Training                                       Modalities

## 2023-07-14 ENCOUNTER — OFFICE VISIT (OUTPATIENT)
Dept: PHYSICAL THERAPY | Age: 58
End: 2023-07-14
Payer: COMMERCIAL

## 2023-07-14 DIAGNOSIS — M54.16 LUMBAR RADICULOPATHY: Primary | ICD-10-CM

## 2023-07-14 PROCEDURE — 97140 MANUAL THERAPY 1/> REGIONS: CPT

## 2023-07-14 PROCEDURE — 97110 THERAPEUTIC EXERCISES: CPT

## 2023-07-14 NOTE — PROGRESS NOTES
Daily Note     Today's date: 2023  Patient name: Deborah Cardenas  : 1965  MRN: [id number]  Referring provider: Jacob Flanagan MD  Dx:   Encounter Diagnosis     ICD-10-CM    1. Lumbar radiculopathy  M54.16                      Subjective: Pt reports that she is feeling about the same as her IE. Notes more pain present with prolonged sitting with decreased levels with ambulation. Notes that if she walks a lot during the day her pain is not as high but she pays for it later at night when trying to sleep. Radiating symptoms at times into her LLE to her foot. Objective: See treatment diary below      Assessment: Tolerated treatment well. Began session on the nu step as an active warmup. Focused on LE and glute flexibility as well as core stability. No increased symptoms present throughout with pt doing well overall. Educated pt to try and walk every hour. Patient demonstrated fatigue post treatment, exhibited good technique with therapeutic exercises and would benefit from continued PT      Plan: Continue per plan of care.       Precautions: standard    Daily Treatment Diary:    Manuals 7/10 7/14           LB/LE's 10 10                                                  Neuro Re-Ed                                                    Ther Ex             HEP 5' 5'           B heel slides 30x 30x           bridges 20x 20x           Stand ball press 5" 20x 5"x20           Hip abduction  35# 20x           Hip adduction  40# 20x           Slant board  30"x3           Nustep for strength 5' L4 5' L4           multifidus  BTB 15x ea                                                  Ther Activity                                       Gait Training                                       Modalities

## 2023-07-19 ENCOUNTER — OFFICE VISIT (OUTPATIENT)
Dept: PHYSICAL THERAPY | Age: 58
End: 2023-07-19
Payer: COMMERCIAL

## 2023-07-19 DIAGNOSIS — M54.16 LUMBAR RADICULOPATHY: Primary | ICD-10-CM

## 2023-07-19 PROCEDURE — 97110 THERAPEUTIC EXERCISES: CPT

## 2023-07-19 PROCEDURE — 97140 MANUAL THERAPY 1/> REGIONS: CPT

## 2023-07-19 NOTE — PROGRESS NOTES
Daily Note     Today's date: 2023  Patient name: Renetta Perez  : 1965  MRN: [id number]  Referring provider: Tulio Riggs MD  Dx:   Encounter Diagnosis     ICD-10-CM    1. Lumbar radiculopathy  M54.16           Start Time: 3587  Stop Time: 0740  Total time in clinic (min): 47 minutes    Subjective: Reports some soreness at her LB today. Continued knee pain that fluctuates. Objective: See treatment diary below      Assessment: Tolerated treatment well. Added supine TA set with marching to work on core strength, muscle fatigue noted but no loss of form. Also added q-ped alt opp LE/UE with cues to maintain TA set. No c/o knee pain with this activity. Some tactile cueing to maintain neutral spine and cues to avoid excessive rotation or lumbar lordosis. No c/o pain with progression of program. Patient would benefit from continued PT. Plan: Continue per plan of care.       Precautions: standard    Daily Treatment Diary:    Manuals 7/10 7/14 7/19          LB/LE's 10 10 10'                                                 Neuro Re-Ed                                                    Ther Ex             HEP 5' 5'           B heel slides 30x 30x 30x          bridges 20x 20x 20x          TA set with march   20x ea          Q-ped alt  UE/LE   10x ea          Stand ball press 5" 20x 5"x20 5"x20          Hip abduction  35# 20x 35# 30x          Hip adduction  40# 20x 40# 30x           Slant board  30"x3 L3 30"x4          Nustep for strength 5' L4 5' L4 L4 10'          multifidus  BTB 15x ea BTB 15x ea                                                 Ther Activity                                       Gait Training                                       Modalities

## 2023-07-21 ENCOUNTER — OFFICE VISIT (OUTPATIENT)
Dept: PHYSICAL THERAPY | Age: 58
End: 2023-07-21
Payer: COMMERCIAL

## 2023-07-21 DIAGNOSIS — M54.16 LUMBAR RADICULOPATHY: Primary | ICD-10-CM

## 2023-07-21 PROCEDURE — 97110 THERAPEUTIC EXERCISES: CPT

## 2023-07-21 PROCEDURE — 97140 MANUAL THERAPY 1/> REGIONS: CPT

## 2023-07-21 PROCEDURE — 97112 NEUROMUSCULAR REEDUCATION: CPT

## 2023-07-21 NOTE — PROGRESS NOTES
Daily Note     Today's date: 2023  Patient name: Tim Rojas  : 1965  MRN: [id number]  Referring provider: Sabina Ashford MD  Dx:   Encounter Diagnosis     ICD-10-CM    1. Lumbar radiculopathy  M54.16           Start Time: 3993  Stop Time: 9950  Total time in clinic (min): 44 minutes    Subjective: Patient states that she received an injection in the L shoulder. She states shoulder flared up and made her sleep uncomfortable which increased her lower back pain. Objective: See treatment diary below      Assessment: Session initiated on NuStep for hip ROM and LE strengthening. Patient mod tight in flexion/abduction with tenderness at endrange, but improved post manual stretching. Tolerated treatment well. Patient demonstrated fatigue post treatment and would benefit from continued PT to improve core strengthening and L-S mobility. Plan: Continue per plan of care.       Precautions: standard    Daily Treatment Diary:    Manuals 7/10 7/14 7/19 7/21         LB/LE's 10 10 10' 10                                                Neuro Re-Ed                                                    Ther Ex             HEP 5' 5'           B heel slides 30x 30x 30x 30x, 5"         bridges 20x 20x 20x 20x         TA set with march   20x ea 20x ea         Q-ped alt  UE/LE   10x ea 10x ea         Stand ball press 5" 20x 5"x20 5"x20 5"x20         Hip abduction  35# 20x 35# 30x 35# 30x         Hip adduction  40# 20x 40# 30x  40# 30x         Slant board  30"x3 L3 30"x4 L3 30"x4         Nustep for strength 5' L4 5' L4 L4 10' L4 10'         multifidus  BTB 15x ea BTB 15x ea BTB 15x ea                                                Ther Activity                                       Gait Training                                       Modalities

## 2023-07-26 ENCOUNTER — OFFICE VISIT (OUTPATIENT)
Dept: PHYSICAL THERAPY | Age: 58
End: 2023-07-26
Payer: COMMERCIAL

## 2023-07-26 DIAGNOSIS — M54.16 LUMBAR RADICULOPATHY: Primary | ICD-10-CM

## 2023-07-26 PROCEDURE — 97140 MANUAL THERAPY 1/> REGIONS: CPT

## 2023-07-26 PROCEDURE — 97110 THERAPEUTIC EXERCISES: CPT

## 2023-07-26 NOTE — PROGRESS NOTES
Daily Note     Today's date: 2023  Patient name: Anushka Winslow  : 1965  MRN: [id number]  Referring provider: Hussain Sung MD  Dx:   Encounter Diagnosis     ICD-10-CM    1. Lumbar radiculopathy  M54.16           Start Time: 745  Stop Time: 5510  Total time in clinic (min): 50 minutes    Subjective: Reports some pain when laying flat or sitting too long and dependent on her activity. Objective: See treatment diary below      Assessment: Tolerated treatment well. Some discomfort at her R LB noted after completing bridges. Added lat pull downs with good tolerance, cues for core engagement. Improved core control during q-ped alt UE/LE today with some tactile and verbal cueing. Relief post manual stretching. Patient would benefit from continued PT. Plan: Continue per plan of care.       Precautions: standard    Daily Treatment Diary:    Manuals 7/10 7/14 7/19 7/21 7/26        LB/LE's 10 10 10' 10 10'                                                Neuro Re-Ed                                                    Ther Ex             HEP 5' 5'           B heel slides 30x 30x 30x 30x, 5" 30x         bridges 20x 20x 20x 20x 20x *pain        TA set with march   20x ea 20x ea 20x ea        Q-ped alt  UE/LE   10x ea 10x ea 10x ea        Stand ball press 5" 20x 5"x20 5"x20 5"x20 5''x20        Hip abduction  35# 20x 35# 30x 35# 30x 40# 30x        Hip adduction  40# 20x 40# 30x  40# 30x 40# 30x        Slant board  30"x3 L3 30"x4 L3 30"x4 L3 30"x4         Nustep for strength 5' L4 5' L4 L4 10' L4 10' L5 10'         multifidus  BTB 15x ea BTB 15x ea BTB 15x ea BTB 15x ea        Lat pull down     BTB 2x10                                  Ther Activity                                       Gait Training                                       Modalities

## 2023-07-28 ENCOUNTER — OFFICE VISIT (OUTPATIENT)
Dept: PHYSICAL THERAPY | Age: 58
End: 2023-07-28
Payer: COMMERCIAL

## 2023-07-28 DIAGNOSIS — M54.16 LUMBAR RADICULOPATHY: Primary | ICD-10-CM

## 2023-07-28 PROCEDURE — 97110 THERAPEUTIC EXERCISES: CPT

## 2023-07-28 PROCEDURE — 97140 MANUAL THERAPY 1/> REGIONS: CPT

## 2023-07-28 NOTE — PROGRESS NOTES
Daily Note     Today's date: 2023  Patient name: Gela Langley  : 1965  MRN: [id number]  Referring provider: Alaina Bains MD  Dx:   Encounter Diagnosis     ICD-10-CM    1. Lumbar radiculopathy  M54.16           Start Time: 0900  Stop Time: 09  Total time in clinic (min): 45 minutes    Subjective: Reports feeling ok until she went to lift a laundry basket from hip height, states she felt her back go into spasm on the R side and couldn't move for a while. States she feels better now. Objective: See treatment diary below      Assessment: Tolerated treatment well. Attempted bridging again today, c/o pain during ecc lowering, discontinued after 13 reps. Patient was able to complete the remainder of her exercises without issues. Introduced SANTI with some discomfort noted at end range that is abolished upon returning to neutral and with repetition. Will assess response next session. Patient would benefit from continued PT. Plan: Continue per plan of care.       Precautions: standard    Daily Treatment Diary:    Manuals 7/10 7/14 7/19 7/21 7/26 7/28       LB/LE's 10 10 10' 10 10'  10'                                              Neuro Re-Ed                                                    Ther Ex             HEP 5' 5'           B heel slides 30x 30x 30x 30x, 5" 30x  30x       bridges 20x 20x 20x 20x 20x *pain 13x pain       TA set with march   20x ea 20x ea 20x ea 20x ea       Q-ped alt  UE/LE   10x ea 10x ea 10x ea 12x ea       Stand ball press 5" 20x 5"x20 5"x20 5"x20 5''x20 5''x20       Hip abduction  35# 20x 35# 30x 35# 30x 40# 30x 40# 30x       Hip adduction  40# 20x 40# 30x  40# 30x 40# 30x 40# 30x        Slant board  30"x3 L3 30"x4 L3 30"x4 L3 30"x4  L3 30"x4       Nustep for strength 5' L4 5' L4 L4 10' L4 10' L5 10'  L5 10'        multifidus  BTB 15x ea BTB 15x ea BTB 15x ea BTB 15x ea BTB 15x ea        Lat pull down     BTB 2x10 BTB 2x10       SANTI      10x                    Ther Activity Gait Training                                       Modalities

## 2023-08-07 ENCOUNTER — APPOINTMENT (OUTPATIENT)
Dept: PHYSICAL THERAPY | Age: 58
End: 2023-08-07
Payer: COMMERCIAL

## 2023-08-09 ENCOUNTER — OFFICE VISIT (OUTPATIENT)
Dept: PHYSICAL THERAPY | Age: 58
End: 2023-08-09
Payer: COMMERCIAL

## 2023-08-09 DIAGNOSIS — M54.16 LUMBAR RADICULOPATHY: Primary | ICD-10-CM

## 2023-08-09 PROCEDURE — 97140 MANUAL THERAPY 1/> REGIONS: CPT

## 2023-08-09 PROCEDURE — 97110 THERAPEUTIC EXERCISES: CPT

## 2023-08-09 NOTE — PROGRESS NOTES
Daily Note     Today's date: 2023  Patient name: Agustín Luna  : 1965  MRN: [id number]  Referring provider: Mike Grove MD  Dx:   Encounter Diagnosis     ICD-10-CM    1. Lumbar radiculopathy  M54.16                      Subjective: Notes low back discomfort on R side, notable when ascending stairs and changing positions. Pain stays on right side. Objective: See treatment diary below. Verbal consent for lumbar gap. Assessment: Tolerated treatment well, pinching on R side with bridges, lumbar gap performed by PT RT, pain abolished and patient was able to perform bridges with no pain. Overall positive response to manual therapy with patients subjective reports of no pain post session. Provided patient with education regarding body mechanics with lifting and posturing t/o the day. Patient would benefit from continued PT. Plan: Continue per plan of care.       Precautions: standard    Daily Treatment Diary:    Manuals 7/10 7/14 7/19 7/21 7/26 7/28 8      LB/LE's 10 10 10' 10 10'  10'       Sacral rocking       10'      Lumbar gapping       RT                   Neuro Re-Ed                                                    Ther Ex             HEP 5' 5'           B heel slides 30x 30x 30x 30x, 5" 30x  30x NT      bridges 20x 20x 20x 20x 20x *pain 13x pain 20x       TA set with march   20x ea 20x ea 20x ea 20x ea NT      Q-ped alt  UE/LE   10x ea 10x ea 10x ea 12x ea nv      Stand ball press 5" 20x 5"x20 5"x20 5"x20 5''x20 5''x20 5" x20      Hip abduction  35# 20x 35# 30x 35# 30x 40# 30x 40# 30x 40# 30x      Hip adduction  40# 20x 40# 30x  40# 30x 40# 30x 40# 30x  40# 30x      Slant board  30"x3 L3 30"x4 L3 30"x4 L3 30"x4  L3 30"x4 L3 30"x4      Nustep for strength 5' L4 5' L4 L4 10' L4 10' L5 10'  L5 10'  L5 10'      multifidus  BTB 15x ea BTB 15x ea BTB 15x ea BTB 15x ea BTB 15x ea  BTB 15x      Lat pull down     BTB 2x10 BTB 2x10 BTB 20x      SANTI      10x 10x      Open book       10x ea Ther Activity                                       Gait Training                                       Modalities

## 2023-08-10 ENCOUNTER — OFFICE VISIT (OUTPATIENT)
Dept: PHYSICAL THERAPY | Age: 58
End: 2023-08-10
Payer: COMMERCIAL

## 2023-08-10 DIAGNOSIS — M54.16 LUMBAR RADICULOPATHY: Primary | ICD-10-CM

## 2023-08-10 PROCEDURE — 97110 THERAPEUTIC EXERCISES: CPT

## 2023-08-10 PROCEDURE — 97140 MANUAL THERAPY 1/> REGIONS: CPT

## 2023-08-10 NOTE — PROGRESS NOTES
Daily Note     Today's date: 8/10/2023  Patient name: Africa Manley  : 1965  MRN: [id number]  Referring provider: Radha Erickson MD  Dx:   Encounter Diagnosis     ICD-10-CM    1. Lumbar radiculopathy  M54.16           Start Time: 830  Stop Time: 930  Total time in clinic (min): 60 minutes    Subjective: Patient arrived feeling good overall since last session. Mild pain on R side, but overall improved. Objective: See treatment diary below  Patient was seen 1:1 for 30 min. Assessment: Mild R sided pain with R multifidus press and open book in L S/L. Did attempt bridges with slight pain at R side, but not as intense as in previous sessions as noted by patient. Held this activity for today. TTP along R lumbar paraspinals and discomfort with sacral rocking when applying OP on R side, hypermobility of sacrum noted. Continues with improvements post session, but does have lingering R sided pain. Patient would benefit from continued PT. Plan: Continue per plan of care.       Precautions: standard    Daily Treatment Diary:    Manuals 7/10 7/14 7/19 7/21 7/26 7/28 8/9 8/10     LB/LE's 10 10 10' 10 10'  10'       Sacral rocking       10' JR     Lumbar gapping       RT SG                  Neuro Re-Ed                                                    Ther Ex             HEP 5' 5'           B heel slides 30x 30x 30x 30x, 5" 30x  30x NT NV     bridges 20x 20x 20x 20x 20x *pain 13x pain 20x  x3 *slight pain     TA set with march   20x ea 20x ea 20x ea 20x ea NT NV     Q-ped alt  UE/LE   10x ea 10x ea 10x ea 12x ea nv NV     Stand ball press 5" 20x 5"x20 5"x20 5"x20 5''x20 5''x20 5" x20 5''x20     Hip abduction  35# 20x 35# 30x 35# 30x 40# 30x 40# 30x 40# 30x 40# 30x     Hip adduction  40# 20x 40# 30x  40# 30x 40# 30x 40# 30x  40# 30x 40# 30x     Slant board  30"x3 L3 30"x4 L3 30"x4 L3 30"x4  L3 30"x4 L3 30"x4 L3 30"x4     Nustep for strength 5' L4 5' L4 L4 10' L4 10' L5 10'  L5 10'  L5 10' L5 10' multifidus  BTB 15x ea BTB 15x ea BTB 15x ea BTB 15x ea BTB 15x ea  BTB 15x BTB 15x      Lat pull down     BTB 2x10 BTB 2x10 BTB 20x BTB 20x     SANTI      10x 10x 10x      Open book       10x ea 10x ea     glute med cladaryl         NV      Ther Activity                                       Gait Training                                       Modalities

## 2023-08-14 ENCOUNTER — OFFICE VISIT (OUTPATIENT)
Dept: PHYSICAL THERAPY | Age: 58
End: 2023-08-14
Payer: COMMERCIAL

## 2023-08-14 DIAGNOSIS — M54.16 LUMBAR RADICULOPATHY: Primary | ICD-10-CM

## 2023-08-14 PROCEDURE — 97110 THERAPEUTIC EXERCISES: CPT

## 2023-08-14 NOTE — PROGRESS NOTES
Daily Note     Today's date: 2023  Patient name: Oseas Mills  : 1965  MRN: [id number]  Referring provider: Braeden Oakes MD  Dx:   Encounter Diagnosis     ICD-10-CM    1. Lumbar radiculopathy  M54.16           Start Time: 0900  Stop Time: 1000  Total time in clinic (min): 60 minutes    Subjective: Patient reports she is flared up since sitting in the car for around 5 hrs over the weekend one way. She reports yesterday it really hit her regarding pain. She states she is having most of her sx on the right side. Objective: See treatment diary below    Assessment: Patient had overall good tolerance with modified session due to flare up. She had fluctuations in right side ow back pain, pinching, ache with education to stay within tolerance/good pain verses bad with seemingly good understanding. Patient would benefit from continued PT. Plan: Continue per plan of care.    CONSIDER STARTING WITH MANUAL WORK NEXT SESSION if appropriate     Precautions: standard    Daily Treatment Diary:    Manuals 7/10 7/14 7/19 7/21 7/26 7/28 8/9 8/10 8/14    LB/LE's 10 10 10' 10 10'  10'       Sacral rocking       10' JR     Lumbar gapping       RT SG                  Neuro Re-Ed                                                    Ther Ex             HEP 5' 5'           B heel slides 30x 30x 30x 30x, 5" 30x  30x NT NV 10x ea    bridges 20x 20x 20x 20x 20x *pain 13x pain 20x  x3 *slight pain 10x total throughout session    TA set with march   20x ea 20x ea 20x ea 20x ea NT NV 10x    Q-ped alt  UE/LE   10x ea 10x ea 10x ea 12x ea nv NV     Stand ball press 5" 20x 5"x20 5"x20 5"x20 5''x20 5''x20 5" x20 5''x20     Hip abduction  35# 20x 35# 30x 35# 30x 40# 30x 40# 30x 40# 30x 40# 30x     Hip adduction  40# 20x 40# 30x  40# 30x 40# 30x 40# 30x  40# 30x 40# 30x Ball squeeze 5x PAIN    Slant board  30"x3 L3 30"x4 L3 30"x4 L3 30"x4  L3 30"x4 L3 30"x4 L3 30"x4     Nustep for strength 5' L4 5' L4 L4 10' L4 10' L5 10'  L5 10' L5 10' L5 10'      multifidus  BTB 15x ea BTB 15x ea BTB 15x ea BTB 15x ea BTB 15x ea  BTB 15x BTB 15x      Lat pull down     BTB 2x10 BTB 2x10 BTB 20x BTB 20x     SANTI      10x 10x 10x  2x10    Open book       10x ea 10x ea     glute med clamshells         NV  standing 2x10 ea    Ther Activity                                       Gait Training                                       Modalities                      CP left sidelying 6'

## 2023-08-16 ENCOUNTER — OFFICE VISIT (OUTPATIENT)
Dept: PHYSICAL THERAPY | Age: 58
End: 2023-08-16
Payer: COMMERCIAL

## 2023-08-16 DIAGNOSIS — M54.16 LUMBAR RADICULOPATHY: Primary | ICD-10-CM

## 2023-08-16 PROCEDURE — 97110 THERAPEUTIC EXERCISES: CPT | Performed by: PHYSICAL THERAPIST

## 2023-08-16 PROCEDURE — 97140 MANUAL THERAPY 1/> REGIONS: CPT | Performed by: PHYSICAL THERAPIST

## 2023-08-16 NOTE — PROGRESS NOTES
Daily Note     Today's date: 2023  Patient name: Brandt Ramos  : 1965  MRN: [id number]  Referring provider: Belle Hoskins MD  Dx:   Encounter Diagnosis     ICD-10-CM    1. Lumbar radiculopathy  M54.16           Start Time: 0845  Stop Time: 930  Total time in clinic (min): 45 minutes    Subjective: better than monday      Objective: See treatment diary below      Assessment: Tolerated treatment fair. Patient demonstrated fatigue post treatment, exhibited good technique with therapeutic exercises and would benefit from continued PT, slowly progressing with trunk ROM and strength, still with decreased Core strength but improving      Plan: Progress treatment as tolerated.        Precautions: standard    Daily Treatment Diary:    Manuals 7/10 7/14 7/19 7/21 7/26 7/28 8/9 8/10 8/14 8/16   LB/LE's 10 10 10' 10 10'  10'    10   Sacral rocking       10' JR  RT   Lumbar gapping       RT SG  RT                Neuro Re-Ed                                                    Ther Ex             HEP 5' 5'           B heel slides 30x 30x 30x 30x, 5" 30x  30x NT NV 10x ea 30x   bridges 20x 20x 20x 20x 20x *pain 13x pain 20x  x3 *slight pain 10x total throughout session 20x   TA set with march   20x ea 20x ea 20x ea 20x ea NT NV 10x    Q-ped alt  UE/LE   10x ea 10x ea 10x ea 12x ea nv NV     Stand ball press 5" 20x 5"x20 5"x20 5"x20 5''x20 5''x20 5" x20 5''x20  30x   Hip abduction  35# 20x 35# 30x 35# 30x 40# 30x 40# 30x 40# 30x 40# 30x  40/30   Hip adduction  40# 20x 40# 30x  40# 30x 40# 30x 40# 30x  40# 30x 40# 30x Ball squeeze 5x PAIN 40/30   Slant board  30"x3 L3 30"x4 L3 30"x4 L3 30"x4  L3 30"x4 L3 30"x4 L3 30"x4  4x   Nustep for strength 5' L4 5' L4 L4 10' L4 10' L5 10'  L5 10'  L5 10' L5 10'   10 min   multifidus  BTB 15x ea BTB 15x ea BTB 15x ea BTB 15x ea BTB 15x ea  BTB 15x BTB 15x   15x   Lat pull down     BTB 2x10 BTB 2x10 BTB 20x BTB 20x  30x   SANTI      10x 10x 10x  2x10 30x   Open book       10x ea 10x ea 10x   glute med clamshells         NV  standing 2x10 ea    Ther Activity                                       Gait Training                                       Modalities                      CP left sidelying 6'

## 2023-08-17 ENCOUNTER — APPOINTMENT (OUTPATIENT)
Dept: PHYSICAL THERAPY | Age: 58
End: 2023-08-17
Payer: COMMERCIAL

## 2023-08-21 ENCOUNTER — OFFICE VISIT (OUTPATIENT)
Dept: PHYSICAL THERAPY | Age: 58
End: 2023-08-21
Payer: COMMERCIAL

## 2023-08-21 DIAGNOSIS — M54.16 LUMBAR RADICULOPATHY: Primary | ICD-10-CM

## 2023-08-21 PROCEDURE — 97140 MANUAL THERAPY 1/> REGIONS: CPT

## 2023-08-21 PROCEDURE — 97110 THERAPEUTIC EXERCISES: CPT

## 2023-08-21 NOTE — PROGRESS NOTES
Daily Note     Today's date: 2023  Patient name: Gela Shea  : 1965  MRN: [id number]  Referring provider: Delmer Myles MD  Dx:   Encounter Diagnosis     ICD-10-CM    1. Lumbar radiculopathy  M54.16           Start Time: 845  Stop Time: 1032  Total time in clinic (min): 55 minutes    Subjective: Reports feeling better overall but her pain comes and goes. States her R side is still bothering her. She has a f/u with her MD on Monday. Objective: See treatment diary below      Assessment: Tolerated treatment fair. Mild discomfort noted with R multifidus press that subsided with a standing rest break. Patient was able to perform all reps for bridges today without significant pain, slight modification in ROM. Focused on core stability this session. Progressed TA set with marching to 90/90 heel tap with good tolerance, no pain, just fatigue. Some relief noted post session today. Patient would benefit from continued PT      Plan: Continue per plan of care. Progress note during next visit.       Precautions: standard    Daily Treatment Diary:    Manuals 8/21   7/19 7/21 7/26 7/28 8/9 8/10 8/14 8/16   LB/LE's   10' 10 10'  10'    10   Sacral rocking JR      10' JR  RT   Lumbar gapping       RT SG  RT   STM R lumbar paraspinals JR            Neuro Re-Ed                                                    Ther Ex             HEP             B heel slides   30x 30x, 5" 30x  30x NT NV 10x ea 30x   bridges 20x   20x 20x 20x *pain 13x pain 20x  x3 *slight pain 10x total throughout session 20x   TA set with 90/90 march 2x10 alt  20x ea 20x ea 20x ea 20x ea NT NV 10x    Q-ped alt  UE/LE 10x   10x ea 10x ea 10x ea 12x ea nv NV     Stand ball press 5"x20   5"x20 5"x20 5''x20 5''x20 5" x20 5''x20  30x   Hip abduction 40# 30x  35# 30x 35# 30x 40# 30x 40# 30x 40# 30x 40# 30x  40/30   Hip adduction 40# 30x   40# 30x  40# 30x 40# 30x 40# 30x  40# 30x 40# 30x Ball squeeze 5x PAIN 40/30   Slant board L3 30"x4  L3 30"x4 L3 30"x4 L3 30"x4  L3 30"x4 L3 30"x4 L3 30"x4  4x   Nustep for strength L5 10'   L4 10' L4 10' L5 10'  L5 10'  L5 10' L5 10'   10 min   multifidus BTB 15x   BTB 15x ea BTB 15x ea BTB 15x ea BTB 15x ea  BTB 15x BTB 15x   15x   Lat pull down BTB 30x     BTB 2x10 BTB 2x10 BTB 20x BTB 20x  30x   SANTI 2x10      10x 10x 10x  2x10 30x   Open book 10x ea       10x ea 10x ea  10x   S/L glute med clamshells (press heels together) 5''x20 ea       NV  standing 2x10 ea    Ther Activity                                       Gait Training                                       Modalities                      CP left sidelying 6'

## 2023-08-24 ENCOUNTER — EVALUATION (OUTPATIENT)
Dept: PHYSICAL THERAPY | Age: 58
End: 2023-08-24
Payer: COMMERCIAL

## 2023-08-24 ENCOUNTER — APPOINTMENT (OUTPATIENT)
Dept: PHYSICAL THERAPY | Age: 58
End: 2023-08-24
Payer: COMMERCIAL

## 2023-08-24 DIAGNOSIS — M54.16 LUMBAR RADICULOPATHY: Primary | ICD-10-CM

## 2023-08-24 PROCEDURE — 97110 THERAPEUTIC EXERCISES: CPT | Performed by: PHYSICAL THERAPIST

## 2023-08-24 NOTE — LETTER
2023    Shelton Kohli MD  [address]   [facility] [address]    Patient: Donta Armstrong   YOB: 1965   Date of Visit: 2023     Encounter Diagnosis     ICD-10-CM    1. Lumbar radiculopathy  M54.16           Dear Dr. Sushila Almendarez:    Thank you for your recent referral of Donta Armstrong. Please review the attached evaluation summary from Rosa's recent visit. Please verify that you agree with the plan of care by signing the attached order. If you have any questions or concerns, please do not hesitate to call. I sincerely appreciate the opportunity to share in the care of one of your patients and hope to have another opportunity to work with you in the near future. Sincerely,    Sudarshan Vu, PT      Referring Provider:      I certify that I have read the below Plan of Care and certify the need for these services furnished under this plan of treatment while under my care. Shelton Kohli MD  [address] [address]  Via Fax: [phone]          PT Re-Evaluation     Today's date: 2023  Patient name: Donta Armstrong  : 1965  MRN: [id number]  Referring provider: Shelton Kohli MD  Dx:   Encounter Diagnosis     ICD-10-CM    1. Lumbar radiculopathy  M54.16           Start Time: 1033  Stop Time: 0100  Total time in clinic (min): 53 minutes    Assessment  Assessment details: Donta Armstrong is a 60 y.o. female who presents with pain, decreased strength and decreased ROM. Due to these impairments, Patient has difficulty performing a/iadls and recreational activities. Patient's clinical presentation is consistent with their referring diagnosis of chronic low back pain Patient has been doing PT and making small gains. She continues to get pain and tenderness RLB mostly. She is seeing MD on Monday. Patient will need insurance visits extended if PT to continue.   Patient would benefit from skilled physical therapy to address their aforementioned impairments, improve their level of function and to improve their overall quality of life. Impairments: abnormal or restricted ROM, activity intolerance, impaired physical strength, lacks appropriate home exercise program, pain with function, poor posture  and poor body mechanics    Symptom irritability: moderateUnderstanding of Dx/Px/POC: good   Prognosis: good    Goals  ST-3 WEEKS  1. Decrease pain < 7/10 on VAS at its worst. Goal not met. Ongoing goal.   2.  Increase ROM by > 5 deg in all deficients planes. Progressing towards. Ongoing goal.   3.  Increase core strength by 1/2 MMT grade. Progressing towards. Ongoing goal.   4. Decrease radicular symptoms LLE by 50%. Progressing towards. Ongoing goal.     LT-6 WEEKS  1. Patient to be independent with a/iadls. Ongoing goal.   2. Increase functional activities for leisure and home activities to previous LOF. Ongoing goal.   3. Independent with HEP and/or fitness program. Ongoing goal.     Plan  Patient would benefit from: skilled physical therapy  Planned modality interventions: cryotherapy, thermotherapy: hydrocollator packs, unattended electrical stimulation and low level laser therapy  Planned therapy interventions: activity modification, behavior modification, body mechanics training, aquatic therapy, flexibility, functional ROM exercises, home exercise program, IADL retraining, joint mobilization, manual therapy, neuromuscular re-education, patient education, postural training, strengthening, stretching, therapeutic activities, therapeutic exercise and abdominal trunk stabilization  Frequency: 2-3x week. Duration in weeks: 8  Plan of Care beginning date: 7/10/2023  Plan of Care expiration date: 10/10/2023  Treatment plan discussed with: patient        Subjective Evaluation    History of Present Illness  Mechanism of injury: Patient has long history of back pain.  She had MRI in 2018 on LB and had recent falls that aggravated her symptoms. She saw MD for follow up with her knees and mentioned her back pain. She was referred to [facility].   23: Patient seeing MD Monday for follow up. She is sore today due to weather perhaps. But overall she is feeling improvement. Prolonged sitting left sole of foot gets numb, relieves with weight bearing. Recurrent probem    Patient Goals  Patient goals for therapy: decreased pain, increased strength and increased motion  Patient goal: move better,   Pain  Current pain ratin  At worst pain ratin  Location: LB  Quality: dull ache, tight, sharp, radiating and pulling  Relieving factors: change in position and rest  Progression: improved    Social Support  Stairs in house: yes   Lives in: multiple-level home  Lives with: significant other    Employment status: working (realator)    Diagnostic Tests  No diagnostic tests performed    FCE comments: MRI from 2018 in chart      Objective     Postural Observations  Seated posture: good  Standing posture: good        Tenderness     Left Hip   Tenderness in the PSIS. Right Hip   Tenderness in the PSIS. Neurological Testing     Sensation     Lumbar   Left   Intact: light touch    Right   Intact: light touch    Reflexes   Left   Patellar (L4): normal (2+)    Right   Patellar (L4): normal (2+)    Active Range of Motion     Lumbar   Flexion: Active lumbar flexion: tips to ankles.    Extension: 15 degrees  with pain  Left lateral flexion:  WFL  Right lateral flexion:  [facility]    Joint Play   Joints within functional limits: L1, L2, L3, L4 and L5     Pain: L4 and L5   Mechanical Assessment    Cervical      Thoracic      Lumbar    Standing extension: repeated movements  Pain location: no change  Pain intensity: better  Pain level: decreased  painful    Strength/Myotome Testing     Left Hip   Planes of Motion   Flexion: 4+  Extension: 4+  Abduction: 4+  Adduction: 5    Right Hip   Planes of Motion   Flexion: 4+  Extension: 4+  Abduction: 4+  Adduction: 5    Left Knee   Flexion: 4+  Extension: 5    Right Knee   Flexion: 4+  Extension: 5    Left Ankle/Foot   Dorsiflexion: 5  Plantar flexion: 5    Right Ankle/Foot   Dorsiflexion: 5  Plantar flexion: 5    Tests     Lumbar   Positive SIJ compression. Negative sacroiliac distraction. Left   Negative passive SLR and slump test.     Right   Negative passive SLR and slump test.     Left Hip   Negative GABBY and FADIR. Right Hip   Negative GABBY and FADIR. Ambulation   Weight-Bearing Status   Assistive device used: none    Ambulation: Level Surfaces   Ambulation without assistive device: independent    Ambulation: Stairs   Pattern: non-reciprocal  Railings: one rail  Pattern: non-reciprocal  Railings: one rail    Additional Stairs Ambulation Details  Due to B knee pain    General Comments:    Lower quarter screen   Hips: unremarkable    Knee Comments  C/o bilateral knee pain  ROM is WFL for ambulation and ADL's    Ankle/Foot Comments   C/o numbness dorsal left foot in sitting.  Weight bearing relieves symptoms            Precautions:   Daily Treatment Diary:    Manuals 8/21  8/24   7/26 7/28 8/9 8/10 8/14 8/16   LB/LE's     10'  10'    10   Sacral rocking JR SG     10' JR  RT   Lumbar gapping       RT SG  RT   STM R lumbar paraspinals JR SG           Neuro Re-Ed                                                    Ther Ex             HEP             B heel slides  30x   30x  30x NT NV 10x ea 30x   Bridges w/ hip abduction 20x  30x  blue   20x *pain 13x pain 20x  x3 *slight pain 10x total throughout session 20x   TA set with 90/90 march 2x10 alt    20x ea 20x ea NT NV 10x    Q-ped alt  UE/LE 10x  10x ea   10x ea 12x ea nv NV     Stand ball press 5"x20  20x   5''x20 5''x20 5" x20 5''x20  30x   Hip abduction 40# 30x 40# 30x   40# 30x 40# 30x 40# 30x 40# 30x  40/30   Hip adduction 40# 30x  40# 30x   40# 30x 40# 30x  40# 30x 40# 30x Ball squeeze 5x PAIN 40/30   Slant board L3 30"x4 4x   L3 30"x4  L3 30"x4 L3 30"x4 L3 30"x4  4x   Nustep for strength L5 10'  10' L5   L5 10'  L5 10'  L5 10' L5 10'   10 min   multifidus BTB 15x  10x 5"   BTB 15x ea BTB 15x ea  BTB 15x BTB 15x   15x                Lat pull down BTB 30x  30x   BTB 2x10 BTB 2x10 BTB 20x BTB 20x  30x   Thoracic extension over bolster  20x           SANTI 2x10      10x 10x 10x  2x10 30x   Open book 10x ea  15x     10x ea 10x ea  10x   S/L glute med clamshells (press heels together) 5''x20 ea       NV  standing 2x10 ea    Ther Activity                                       Gait Training                                       Modalities                      CP left sidelying 6'

## 2023-08-24 NOTE — PROGRESS NOTES
PT Re-Evaluation     Today's date: 2023  Patient name: Lyndon Dos Santos  : 1965  MRN: [id number]  Referring provider: Kerry Cabral MD  Dx:   Encounter Diagnosis     ICD-10-CM    1. Lumbar radiculopathy  M54.16           Start Time: 1033  Stop Time: 8656  Total time in clinic (min): 53 minutes    Assessment  Assessment details: Lyndon Dos Santos is a 60 y.o. female who presents with pain, decreased strength and decreased ROM. Due to these impairments, Patient has difficulty performing a/iadls and recreational activities. Patient's clinical presentation is consistent with their referring diagnosis of chronic low back pain Patient has been doing PT and making small gains. She continues to get pain and tenderness RLB mostly. She is seeing MD on Monday. Patient will need insurance visits extended if PT to continue. Patient would benefit from skilled physical therapy to address their aforementioned impairments, improve their level of function and to improve their overall quality of life. Impairments: abnormal or restricted ROM, activity intolerance, impaired physical strength, lacks appropriate home exercise program, pain with function, poor posture  and poor body mechanics    Symptom irritability: moderateUnderstanding of Dx/Px/POC: good   Prognosis: good    Goals  ST-3 WEEKS  1. Decrease pain < 7/10 on VAS at its worst. Goal not met. Ongoing goal.   2.  Increase ROM by > 5 deg in all deficients planes. Progressing towards. Ongoing goal.   3.  Increase core strength by 1/2 MMT grade. Progressing towards. Ongoing goal.   4. Decrease radicular symptoms LLE by 50%. Progressing towards. Ongoing goal.     LT-6 WEEKS  1. Patient to be independent with a/iadls. Ongoing goal.   2. Increase functional activities for leisure and home activities to previous LOF.  Ongoing goal.   3. Independent with HEP and/or fitness program. Ongoing goal.     Plan  Patient would benefit from: skilled physical therapy  Planned modality interventions: cryotherapy, thermotherapy: hydrocollator packs, unattended electrical stimulation and low level laser therapy  Planned therapy interventions: activity modification, behavior modification, body mechanics training, aquatic therapy, flexibility, functional ROM exercises, home exercise program, IADL retraining, joint mobilization, manual therapy, neuromuscular re-education, patient education, postural training, strengthening, stretching, therapeutic activities, therapeutic exercise and abdominal trunk stabilization  Frequency: 2-3x week. Duration in weeks: 8  Plan of Care beginning date: 7/10/2023  Plan of Care expiration date: 10/10/2023  Treatment plan discussed with: patient        Subjective Evaluation    History of Present Illness  Mechanism of injury: Patient has long history of back pain. She had MRI in 2018 on LB and had recent falls that aggravated her symptoms. She saw MD for follow up with her knees and mentioned her back pain. [facility] was [facility] to [facility].   23: Patient seeing MD Monday for follow up. She is sore today due to weather perhaps. But overall she is feeling improvement. Prolonged sitting left sole of foot gets numb, relieves with weight bearing. Recurrent probem    Patient Goals  Patient goals for therapy: decreased pain, increased strength and increased motion  Patient goal: move better,   Pain  Current pain ratin  At worst pain ratin  Location: LB  Quality: dull ache, tight, sharp, radiating and pulling  Relieving factors: change in position and rest  Progression: improved    Social Support  Stairs in house: yes   Lives in: multiple-level home  Lives with: significant other    Employment status: working (realator)    Diagnostic Tests  No diagnostic tests performed    FCE comments: MRI from 2018 in chart      Objective     Postural Observations  Seated posture: good  Standing posture: good        Tenderness     Left Hip   Tenderness in the PSIS. Right Hip   Tenderness in the PSIS. Neurological Testing     Sensation     Lumbar   Left   Intact: light touch    Right   Intact: light touch    Reflexes   Left   Patellar (L4): normal (2+)    Right   Patellar (L4): normal (2+)    Active Range of Motion     Lumbar   Flexion: Active lumbar flexion: tips to ankles. Extension: 15 degrees  with pain  Left lateral flexion:  WFL  Right lateral flexion:  [facility]    Joint Play   Joints within functional limits: L1, L2, L3, L4 and L5     Pain: L4 and L5   Mechanical Assessment    Cervical      Thoracic      Lumbar    Standing extension: repeated movements  Pain location: no change  Pain intensity: better  Pain level: decreased  painful    Strength/Myotome Testing     Left Hip   Planes of Motion   Flexion: 4+  Extension: 4+  Abduction: 4+  Adduction: 5    Right Hip   Planes of Motion   Flexion: 4+  Extension: 4+  Abduction: 4+  Adduction: 5    Left Knee   Flexion: 4+  Extension: 5    Right Knee   Flexion: 4+  Extension: 5    Left Ankle/Foot   Dorsiflexion: 5  Plantar flexion: 5    Right Ankle/Foot   Dorsiflexion: 5  Plantar flexion: 5    Tests     Lumbar   Positive SIJ compression. Negative sacroiliac distraction. Left   Negative passive SLR and slump test.     Right   Negative passive SLR and slump test.     Left Hip   Negative GABBY and FADIR. Right Hip   Negative GABBY and FADIR. Ambulation   Weight-Bearing Status   Assistive device used: none    Ambulation: Level Surfaces   Ambulation without assistive device: independent    Ambulation: Stairs   Pattern: non-reciprocal  Railings: one rail  Pattern: non-reciprocal  Railings: one rail    Additional Stairs Ambulation Details  Due to B knee pain    General Comments:    Lower quarter screen   Hips: unremarkable    Knee Comments  C/o bilateral knee pain  ROM is WFL for ambulation and ADL's    Ankle/Foot Comments   C/o numbness dorsal left foot in sitting.  Weight bearing relieves symptoms Precautions:   Daily Treatment Diary:    Manuals 8/21  8/24   7/26 7/28 8/9 8/10 8/14 8/16   LB/LE's     10'  10'    10   Sacral rocking JR SG     10' JR  RT   Lumbar gapping       RT SG  RT   STM R lumbar paraspinals JR SG           Neuro Re-Ed                                                    Ther Ex             HEP             B heel slides  30x   30x  30x NT NV 10x ea 30x   Bridges w/ hip abduction 20x  30x  blue   20x *pain 13x pain 20x  x3 *slight pain 10x total throughout session 20x   TA set with 90/90 march 2x10 alt    20x ea 20x ea NT NV 10x    Q-ped alt  UE/LE 10x  10x ea   10x ea 12x ea nv NV     Stand ball press 5"x20  20x   5''x20 5''x20 5" x20 5''x20  30x   Hip abduction 40# 30x 40# 30x   40# 30x 40# 30x 40# 30x 40# 30x  40/30   Hip adduction 40# 30x  40# 30x   40# 30x 40# 30x  40# 30x 40# 30x Ball squeeze 5x PAIN 40/30   Slant board L3 30"x4 4x   L3 30"x4  L3 30"x4 L3 30"x4 L3 30"x4  4x   Nustep for strength L5 10'  10' L5   L5 10'  L5 10'  L5 10' L5 10'   10 min   multifidus BTB 15x  10x 5"   BTB 15x ea BTB 15x ea  BTB 15x BTB 15x   15x                Lat pull down BTB 30x  30x   BTB 2x10 BTB 2x10 BTB 20x BTB 20x  30x   Thoracic extension over bolster  20x           SANTI 2x10      10x 10x 10x  2x10 30x   Open book 10x ea  15x     10x ea 10x ea  10x   S/L glute med clamshells (press heels together) 5''x20 ea       NV  standing 2x10 ea    Ther Activity                                       Gait Training                                       Modalities                      CP left sidelying 6'

## 2024-01-08 DIAGNOSIS — G43.711 INTRACTABLE CHRONIC MIGRAINE WITHOUT AURA AND WITH STATUS MIGRAINOSUS: ICD-10-CM

## 2024-01-08 RX ORDER — SUMATRIPTAN 100 MG/1
TABLET, FILM COATED ORAL
Qty: 9 TABLET | Refills: 3 | Status: SHIPPED | OUTPATIENT
Start: 2024-01-08

## 2024-01-30 ENCOUNTER — OFFICE VISIT (OUTPATIENT)
Dept: NEUROLOGY | Facility: CLINIC | Age: 59
End: 2024-01-30
Payer: COMMERCIAL

## 2024-01-30 VITALS
OXYGEN SATURATION: 98 % | HEIGHT: 61 IN | DIASTOLIC BLOOD PRESSURE: 80 MMHG | BODY MASS INDEX: 27.6 KG/M2 | SYSTOLIC BLOOD PRESSURE: 110 MMHG | HEART RATE: 70 BPM | WEIGHT: 146.2 LBS

## 2024-01-30 DIAGNOSIS — G43.719 INTRACTABLE CHRONIC MIGRAINE WITHOUT AURA AND WITHOUT STATUS MIGRAINOSUS: Primary | ICD-10-CM

## 2024-01-30 PROCEDURE — 99214 OFFICE O/P EST MOD 30 MIN: CPT | Performed by: PSYCHIATRY & NEUROLOGY

## 2024-01-30 RX ORDER — GABAPENTIN 100 MG/1
100 CAPSULE ORAL 3 TIMES DAILY
Qty: 90 CAPSULE | Refills: 5 | Status: SHIPPED | OUTPATIENT
Start: 2024-01-30

## 2024-01-30 NOTE — PROGRESS NOTES
Rosa Cline is a 58 y.o. female.   Chief Complaint   Patient presents with    Migraine       Assessment:  1. Intractable chronic migraine without aura and without status migrainosus         Plan:  Increase Neurontin to 100 mg 3 times a day.  Magnesium 400 mg  a day.  Riboflavin 400 mg a day.  Imitrex 100 mg as needed once a day for headache maximum twice a week.  Avoid migraine triggers which we discussed in detail including foods to avoid, to keep yourself well-hydrated, avoid excessive use of over-the-counter analgesics, to keep blood pressure cholesterol and sugar under control.  Avoid stress and sleep regularly for 7 to 8 hours a night.    We discussed other options she is agreeable to increase the gabapentin to 100 mg 2 times a day for a couple of weeks and if she has any difficulty in tolerating it then she will try to take it at nighttime and otherwise after 2 weeks to increase it to 3 times a day if she experiences any side effects to stop the medication and let me know if she has not had much relief with Topamax in the past other options would be to use a CGRP medication.  Her MRI scan of the brain from 2019 was reported as normal.    She was advised to continue keeping a blood pressure cholesterol sugar under control healthy lifestyle to remain physically and mentally active as tolerated she follows up with the family physician regarding her hypothyroidism and is on replacement.    Her Lyme test from 6/26/2023 was normal CBC and CMP were also reviewed and were essentially normal her hemoglobin A1c from 11/6/2023 was normal at 5.3.    Patient was advised to go to the hospital if has any worsening symptoms and call me otherwise to see me back in 6 months or sooner if needed and follow-up with the other physicians.              Subjective:    Patient is here in follow-up for history of chronic headaches, since her last visit she tells me that she has been doing reasonably okay, she is on thyroid medication  "for hypothyroidism but continues to have 1-2 headaches a week they are mostly in the temporal and frontal head region associated with photophobia and phonophobia and her headaches last for a few hours she does take Imitrex to help with the headaches, she has been taking gabapentin 100 mg at nighttime without any side effects her appetite is good weight has been stable she feels when the weather is, cloudy her headaches get worse, she is not using the magnesium and riboflavin she has tried Topamax in the past without much help sleep is reasonably okay appetite is good weight has been stable no vision difficulties she does have some neck discomfort and low back discomfort for which she is in follow-up with a chiropractor and family physician no other complaints.      Vitals:    01/30/24 0847   BP: 110/80   BP Location: Left arm   Patient Position: Sitting   Cuff Size: Standard   Pulse: 70   SpO2: 98%   Weight: 66.3 kg (146 lb 3.2 oz)   Height: 5' 1\" (1.549 m)       Current Medications    Current Outpatient Medications:     albuterol (PROVENTIL HFA,VENTOLIN HFA) 90 mcg/act inhaler, every 6 (six) hours as needed , Disp: , Rfl:     Ascorbic Acid (VITAMIN C) 1000 MG tablet, Take 1,000 mg by mouth daily, Disp: , Rfl:     Biotin 68821 MCG TABS, biotin, Disp: , Rfl:     Cholecalciferol 25 MCG (1000 UT) tablet, Take 1,000 Units by mouth daily, Disp: , Rfl:     COLLAGEN PO, Take 10,000 mg by mouth, Disp: , Rfl:     gabapentin (Neurontin) 100 mg capsule, Take 1 capsule (100 mg total) by mouth daily at bedtime, Disp: 30 capsule, Rfl: 5    MAGNESIUM CITRATE PO, Take by mouth, Disp: , Rfl:     magnesium oxide (MAG-OX) 400 mg tablet, Take 400 mg by mouth daily, Disp: , Rfl:     SUMAtriptan (IMITREX) 100 mg tablet, TAKE 1 TABLET BY MOUTH ONCE AS NEEDED FOR MIGRAINE, Disp: 9 tablet, Rfl: 3    milk thistle 175 MG tablet, Take 175 mg by mouth daily (Patient not taking: Reported on 6/26/2023), Disp: , Rfl:     topiramate (TOPAMAX) 25 mg " tablet, TAKE 2 TABLETS (50 MG TOTAL) BY MOUTH DAILY AT BEDTIME (Patient not taking: Reported on 1/30/2024), Disp: 60 tablet, Rfl: 6      Allergies  Patient has no known allergies.    Past Medical History  Past Medical History:   Diagnosis Date    Anemia     Disease of thyroid gland     Head injury     about age 12 ?    Headache, tension-type          Past Surgical History:  Past Surgical History:   Procedure Laterality Date    CERVICAL BIOPSY  W/ LOOP ELECTRODE EXCISION      NO PAST SURGERIES           Family History:  Family History   Problem Relation Age of Onset    No Known Problems Mother     Hypertension Father     Sudden death Father     Hypertension Brother     Hypertension Brother     Cancer Maternal Uncle        Social History:   reports that she has never smoked. She has never used smokeless tobacco. She reports that she does not drink alcohol and does not use drugs.    I have reviewed the past medical history, surgical history, social and family history, current medications, allergies vitals, review of systems, and updated this information as appropriate today.   Objective:    Physical Exam    Neurological Exam     GENERAL:  Cooperative in no acute distress. Well-developed and well-nourished     HEAD and NECK   Head is atraumatic normocephalic with no lesions or masses. Neck is supple with full range of motion     CARDIOVASCULAR  Carotid Arteries-no carotid bruits.     NEUROLOGIC:  Mental Status-the patient is awake alert and oriented without aphasia or apraxia  Cranial Nerves: Visual fields are full to confrontation.  Visual acuity is 20/20 with hand-held extraocular movements are full without nystagmus. Pupils are 2-1/2 mm and reactive. Face is symmetrical to light touch. Movements of facial expression move symmetrically. Hearing is normal to finger rub bilaterally. Soft palate lifts symmetrically. Shoulder shrug is symmetrical. Tongue is midline without atrophy.  Motor: No drift is noted on arm  extension. Strength is full in the upper and lower extremities with normal bulk and tone.  Gait is unremarkable  Paraspinal muscle tenderness in the cervical area    ROS:  Review of Systems   Constitutional:  Negative for appetite change, fatigue and fever.   HENT: Negative.  Negative for hearing loss, tinnitus, trouble swallowing and voice change.    Eyes: Negative.  Negative for photophobia, pain and visual disturbance.   Respiratory: Negative.  Negative for shortness of breath.    Cardiovascular: Negative.  Negative for palpitations.   Gastrointestinal: Negative.  Negative for nausea and vomiting.   Endocrine: Negative.  Negative for cold intolerance.   Genitourinary: Negative.  Negative for dysuria, frequency and urgency.   Musculoskeletal:  Negative for back pain, gait problem, myalgias, neck pain and neck stiffness.   Skin: Negative.  Negative for rash.   Allergic/Immunologic: Negative.    Neurological: Negative.  Negative for dizziness, tremors, seizures, syncope, facial asymmetry, speech difficulty, weakness, light-headedness, numbness and headaches.   Hematological: Negative.  Does not bruise/bleed easily.   Psychiatric/Behavioral: Negative.  Negative for confusion, hallucinations and sleep disturbance.

## 2024-05-02 DIAGNOSIS — G43.711 INTRACTABLE CHRONIC MIGRAINE WITHOUT AURA AND WITH STATUS MIGRAINOSUS: ICD-10-CM

## 2024-05-02 RX ORDER — SUMATRIPTAN 100 MG/1
TABLET, FILM COATED ORAL
Qty: 9 TABLET | Refills: 3 | Status: SHIPPED | OUTPATIENT
Start: 2024-05-02

## 2024-09-05 ENCOUNTER — TELEPHONE (OUTPATIENT)
Dept: NEUROLOGY | Facility: CLINIC | Age: 59
End: 2024-09-05

## 2024-09-06 ENCOUNTER — TELEPHONE (OUTPATIENT)
Dept: NEUROLOGY | Facility: CLINIC | Age: 59
End: 2024-09-06

## 2024-09-09 ENCOUNTER — OFFICE VISIT (OUTPATIENT)
Dept: NEUROLOGY | Facility: CLINIC | Age: 59
End: 2024-09-09
Payer: COMMERCIAL

## 2024-09-09 VITALS
DIASTOLIC BLOOD PRESSURE: 70 MMHG | OXYGEN SATURATION: 98 % | WEIGHT: 140 LBS | HEART RATE: 58 BPM | SYSTOLIC BLOOD PRESSURE: 120 MMHG | BODY MASS INDEX: 26.43 KG/M2 | HEIGHT: 61 IN

## 2024-09-09 DIAGNOSIS — G43.719 INTRACTABLE CHRONIC MIGRAINE WITHOUT AURA AND WITHOUT STATUS MIGRAINOSUS: Primary | ICD-10-CM

## 2024-09-09 PROCEDURE — 99214 OFFICE O/P EST MOD 30 MIN: CPT | Performed by: PSYCHIATRY & NEUROLOGY

## 2024-09-09 NOTE — PROGRESS NOTES
Rosa Cline is a 59 y.o. female.   Chief Complaint   Patient presents with    Migraine       Assessment:  1. Intractable chronic migraine without aura and without status migrainosus         Plan:  Follow-up 6-months.    Discussion:  Would recommend an MRI of the brain and blood work to evaluate for her headaches, since the frequency of headaches has increased, she will call me after the test to discuss the results, we discussed different medication options including a CGRP injection as would avoid using Depakote secondary to the side effect profile, we also discussed regarding amitriptyline 10 mg at nighttime to see if it will help with the headache side effects discussed with her she would like to hold off on that for now and would let me know in the future.    She was advised to avoid migraine triggers which we discussed in detail including foods to avoid, she was advised to keep yourself well-hydrated, avoid excessive use of over-the-counter analgesics and to limit caffeine to 12 to 16 ounces per day, to keep her blood pressure cholesterol sugar and weight under control, to go to the hospital if has any worsening symptoms and call me otherwise to see me back in 6 months or sooner if needed and follow-up with the other physicians.    Subjective:    Patient is here accompanied with her fiancé for her history of chronic headaches, since her last visit she tells me that she continues to have headaches at least twice a week they are mostly unilateral and associated with photophobia phonophobia and about 8 on 10 she has had similar headaches for the last almost 8 to 10 years, she does take an Imitrex to help with her headaches it helps most of the time but sometimes she has to lie down in a dark room for her to get better, no visual symptoms, no nausea or vomiting, no motor or sensory symptoms in upper or lower extremities.  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      Vitals:    09/09/24 0857   BP: 120/70   Patient Position: Sitting   Cuff Size: Large   Pulse: 58   SpO2: 98%   Weight: 63.5 kg (140 lb)   Height: 5' 1\" (1.549 m)       Current Medications    Current Outpatient Medications:     albuterol (PROVENTIL HFA,VENTOLIN HFA) 90 mcg/act inhaler, every 6 (six) hours as needed , Disp: , Rfl:     Ascorbic Acid (VITAMIN C) 1000 MG tablet, Take 1,000 mg by mouth daily, Disp: , Rfl:     Biotin 75758 MCG TABS, biotin, Disp: , Rfl:     Cholecalciferol 25 MCG (1000 UT) tablet, Take 1,000 Units by mouth daily, Disp: , Rfl:     COLLAGEN PO, Take 10,000 mg by mouth, Disp: , Rfl:     gabapentin (Neurontin) 100 mg capsule, Take 1 capsule (100 mg total) by mouth 3 (three) times a day, Disp: 90 capsule, Rfl: 5    MAGNESIUM CITRATE PO, Take by mouth, Disp: , Rfl:     magnesium oxide (MAG-OX) 400 mg tablet, Take 400 mg by mouth daily, Disp: , Rfl:     milk thistle 175 MG tablet, Take 175 mg by mouth daily (Patient not taking: Reported on 6/26/2023), Disp: , Rfl:     SUMAtriptan (IMITREX) 100 mg tablet, TAKE 1 TABLET BY MOUTH ONCE AS NEEDED FOR MIGRAINE, Disp: 9 tablet, Rfl: 3    topiramate (TOPAMAX) 25 mg tablet, TAKE 2 TABLETS (50 MG TOTAL) BY MOUTH DAILY AT BEDTIME (Patient not taking: Reported on 1/30/2024), Disp: 60 tablet, Rfl: 6      Allergies  Patient has no known allergies.    Past Medical History  Past Medical History:   Diagnosis Date    Anemia     Disease of thyroid gland     Head injury     about age 12 ?    Headache, tension-type          Past Surgical History:  Past Surgical History: "   Procedure Laterality Date    CERVICAL BIOPSY  W/ LOOP ELECTRODE EXCISION      NO PAST SURGERIES           Family History:  Family History   Problem Relation Age of Onset    No Known Problems Mother     Hypertension Father     Sudden death Father     Hypertension Brother     Hypertension Brother     Cancer Maternal Uncle        Social History:   reports that she has never smoked. She has never used smokeless tobacco. She reports that she does not drink alcohol and does not use drugs.    I have reviewed the past medical history, surgical history, social and family history, current medications, allergies vitals, review of systems, and updated this information as appropriate today.   Objective:    Physical Exam    Neurological Exam     GENERAL:  Cooperative in no acute distress. Well-developed and well-nourished     HEAD and NECK   Head is atraumatic normocephalic with no lesions or masses. Neck is supple with full range of motion     CARDIOVASCULAR  Carotid Arteries-no carotid bruits.     NEUROLOGIC:  Mental Status-the patient is awake alert and oriented without aphasia or apraxia  Cranial Nerves: Visual fields are full to confrontation.  Visual acuity is 20/20 with hand-held chart extraocular movements are full without nystagmus. Pupils are 2-1/2 mm and reactive. Face is symmetrical to light touch. Movements of facial expression move symmetrically. Hearing is normal to finger rub bilaterally. Soft palate lifts symmetrically. Shoulder shrug is symmetrical. Tongue is midline without atrophy.  Motor: No drift is noted on arm extension. Strength is full in the upper and lower extremities with normal bulk and tone.  Gait is unremarkable  No temporal artery tenderness, no cervical spine tenderness    ROS:  Review of Systems   Constitutional:  Negative for appetite change, fatigue and fever.   HENT: Negative.  Negative for hearing loss, tinnitus, trouble swallowing and voice change.    Eyes: Negative.  Negative for  photophobia, pain and visual disturbance.   Respiratory: Negative.  Negative for shortness of breath.    Cardiovascular: Negative.  Negative for palpitations.   Gastrointestinal: Negative.  Negative for nausea and vomiting.   Endocrine: Negative.  Negative for cold intolerance.   Genitourinary: Negative.  Negative for dysuria, frequency and urgency.   Musculoskeletal:  Negative for back pain, gait problem, myalgias, neck pain and neck stiffness.   Skin: Negative.  Negative for rash.   Allergic/Immunologic: Negative.    Neurological: Negative.  Negative for dizziness, tremors, seizures, syncope, facial asymmetry, speech difficulty, weakness, light-headedness, numbness and headaches.   Hematological: Negative.  Does not bruise/bleed easily.   Psychiatric/Behavioral: Negative.  Negative for confusion, hallucinations and sleep disturbance.

## 2024-10-01 DIAGNOSIS — G43.711 INTRACTABLE CHRONIC MIGRAINE WITHOUT AURA AND WITH STATUS MIGRAINOSUS: ICD-10-CM

## 2024-10-01 RX ORDER — SUMATRIPTAN 100 MG/1
TABLET, FILM COATED ORAL
Qty: 9 TABLET | Refills: 5 | Status: SHIPPED | OUTPATIENT
Start: 2024-10-01

## 2024-10-17 ENCOUNTER — HOSPITAL ENCOUNTER (OUTPATIENT)
Dept: MRI IMAGING | Facility: CLINIC | Age: 59
Discharge: HOME | End: 2024-10-17
Payer: COMMERCIAL

## 2024-10-17 DIAGNOSIS — G43.719 INTRACTABLE CHRONIC MIGRAINE WITHOUT AURA AND WITHOUT STATUS MIGRAINOSUS: ICD-10-CM

## 2024-10-17 PROCEDURE — 70551 MRI BRAIN STEM W/O DYE: CPT

## 2025-03-24 DIAGNOSIS — G43.711 INTRACTABLE CHRONIC MIGRAINE WITHOUT AURA AND WITH STATUS MIGRAINOSUS: ICD-10-CM

## 2025-03-25 RX ORDER — SUMATRIPTAN SUCCINATE 100 MG/1
TABLET ORAL
Qty: 9 TABLET | Refills: 5 | Status: SHIPPED | OUTPATIENT
Start: 2025-03-25

## 2025-04-17 ENCOUNTER — OFFICE VISIT (OUTPATIENT)
Dept: NEUROLOGY | Facility: CLINIC | Age: 60
End: 2025-04-17
Payer: COMMERCIAL

## 2025-04-17 VITALS
DIASTOLIC BLOOD PRESSURE: 80 MMHG | OXYGEN SATURATION: 98 % | BODY MASS INDEX: 26.62 KG/M2 | HEART RATE: 66 BPM | SYSTOLIC BLOOD PRESSURE: 118 MMHG | HEIGHT: 61 IN | WEIGHT: 141 LBS

## 2025-04-17 DIAGNOSIS — G43.719 INTRACTABLE CHRONIC MIGRAINE WITHOUT AURA AND WITHOUT STATUS MIGRAINOSUS: Primary | ICD-10-CM

## 2025-04-17 PROCEDURE — 99214 OFFICE O/P EST MOD 30 MIN: CPT | Performed by: PSYCHIATRY & NEUROLOGY

## 2025-04-17 RX ORDER — METHOCARBAMOL 500 MG/1
500 TABLET, FILM COATED ORAL
COMMUNITY
Start: 2024-12-31

## 2025-04-17 RX ORDER — ATOGEPANT 30 MG/1
TABLET ORAL
Qty: 30 TABLET | Refills: 5 | Status: SHIPPED | OUTPATIENT
Start: 2025-04-17

## 2025-04-17 NOTE — ASSESSMENT & PLAN NOTE
Orders:    Sedimentation rate, automated; Future    C-reactive protein; Future    Lyme Total AB W Reflex to IGM/IGG; Future    Atogepant (Qulipta) 30 MG TABS; 1 tablet p.o. once a day    Ubrogepant (UBRELVY) 50 MG tablet; Take 1 tablet (50 mg) one time as needed for migraine. May repeat one additional tablet (50 mg) at least two hours after the first dose. Do not use more than two doses per day, or for more than eight days per month.  Patient's MRI scan of the brain results were reviewed with her did not show evidence of any acute pathology, we discussed different medication options for the patient she has tried Topamax and Neurontin in the past without much relief she is agreeable to go on Qulipta 30 mg a day side effects discussed with her and to stop if experiences any side effects also she was advised to take Ubrelvy 50 mg as needed for headache and can repeat in 2 hours and to use it maximum twice a week and to discontinue sumatriptan side effects discussed and to stop if experiences any side effects.  Patient will follow-up with the spine specialist regarding her neck pain.    She was advised to avoid migraine triggers which we discussed in detail including foods to avoid she was also advised to keep her self well-hydrated avoid excessive use of over-the-counter analgesics and to limit caffeine to 12 to 16 ounces per day to keep her blood pressure, cholesterol, sugar and weight under control, to go to the hospital if has any worsening symptoms and call me otherwise to see me back in 6 months or sooner if needed and follow-up with the other physicians..

## 2025-04-17 NOTE — PROGRESS NOTES
Neurology Ambulatory Visit  Name: Rosa Cline       : 1965       MRN: [id number]   Encounter Provider: Jacques Schmitt MD   Encounter Date: 2025  Encounter department: [facility]      Rosa Cline is a 59 y.o. female.       Assessment & Plan  Intractable chronic migraine without aura and without status migrainosus    Orders:    Sedimentation rate, automated; Future    C-reactive protein; Future    Lyme Total AB W Reflex to IGM/IGG; Future    Atogepant (Qulipta) 30 MG TABS; 1 tablet p.o. once a day    Ubrogepant (UBRELVY) 50 MG tablet; Take 1 tablet (50 mg) one time as needed for migraine. May repeat one additional tablet (50 mg) at least two hours after the first dose. Do not use more than two doses per day, or for more than eight days per month.  Patient's MRI scan of the brain results were reviewed with her did not show evidence of any acute pathology, we discussed different medication options for the patient she has tried Topamax and Neurontin in the past without much relief she is agreeable to go on Qulipta 30 mg a day side effects discussed with her and to stop if experiences any side effects also she was advised to take Ubrelvy 50 mg as needed for headache and can repeat in 2 hours and to use it maximum twice a week and to discontinue sumatriptan side effects discussed and to stop if experiences any side effects.  Patient will follow-up with the spine specialist regarding her neck pain.    She was advised to avoid migraine triggers which we discussed in detail including foods to avoid she was also advised to keep her self well-hydrated avoid excessive use of over-the-counter analgesics and to limit caffeine to 12 to 16 ounces per day to keep her blood pressure, cholesterol, sugar and weight under control, to go to the hospital if has any worsening symptoms and call me otherwise to see me back in 6 months or sooner if needed and follow-up with the other  physicians..      Subjective:  Chief Complaint   Patient presents with    Intractable chronic migraine without aura and without statu       HISTORY OF PRESENT ILLNESS    Patient is here in follow-up for history of chronic headaches since the last visit she tells me that she still continues to have 3 headaches a week they are mostly unilateral starts in the front and radiates to the back associated with photophobia phonophobia about 7-8 on 10 she has to lie down in a dark room she does take Imitrex that helps her for some time she has had these headaches at least for the last 8 to 10 years no visual symptoms no nausea no vomiting no motor or sensory symptoms in upper or lower extremities, he is on thyroid medication for her history of hypothyroidism.  Patient saw her family physician and has been referred to see a spine specialist for her neck pain, no other complaints.        Prior Work-up:   MRI: Brain without contrast from 10/17/2024 was reported as no acute infarction, intracranial hemorrhage or mass effect..       Past Medical History:    Past Medical History:   Diagnosis Date    Anemia     Disease of thyroid gland     Head injury     about age 12 ?    Headache, tension-type      Past Surgical History:   Procedure Laterality Date    CERVICAL BIOPSY  W/ LOOP ELECTRODE EXCISION      NO PAST SURGERIES         Family History:  Family History   Problem Relation Age of Onset    No Known Problems Mother     Hypertension Father     Sudden death Father     Hypertension Brother     Hypertension Brother     Cancer Maternal Uncle        Social History:  Social History     Tobacco Use    Smoking status: Never    Smokeless tobacco: Never   Vaping Use    Vaping status: Never Used   Substance Use Topics    Alcohol use: No    Drug use: No      Living situation:    Work:      Allergies:  No Known Allergies    Medications:    Current Outpatient Medications:     albuterol (PROVENTIL HFA,VENTOLIN HFA) 90 mcg/act inhaler, every 6  "(six) hours as needed , Disp: , Rfl:     Ascorbic Acid (VITAMIN C) 1000 MG tablet, Take 1,000 mg by mouth daily, Disp: , Rfl:     Biotin 70825 MCG TABS, biotin, Disp: , Rfl:     Cholecalciferol 25 MCG (1000 UT) tablet, Take 1,000 Units by mouth daily, Disp: , Rfl:     COLLAGEN PO, Take 10,000 mg by mouth, Disp: , Rfl:     magnesium oxide (MAG-OX) 400 mg tablet, Take 400 mg by mouth daily, Disp: , Rfl:     methocarbamol (ROBAXIN) 500 mg tablet, Take 500 mg by mouth daily at bedtime 2 tabs at bedtime, Disp: , Rfl:     SUMAtriptan (IMITREX) 100 mg tablet, TAKE 1 TABLET BY MOUTH ONCE AS NEEDED FOR MIGRAINE (9 FOR 30 DAYS PER INS), Disp: 9 tablet, Rfl: 5    gabapentin (Neurontin) 100 mg capsule, Take 1 capsule (100 mg total) by mouth 3 (three) times a day, Disp: 90 capsule, Rfl: 5    MAGNESIUM CITRATE PO, Take by mouth, Disp: , Rfl:     milk thistle 175 MG tablet, Take 175 mg by mouth daily (Patient not taking: Reported on 6/26/2023), Disp: , Rfl:     topiramate (TOPAMAX) 25 mg tablet, TAKE 2 TABLETS (50 MG TOTAL) BY MOUTH DAILY AT BEDTIME (Patient not taking: Reported on 1/30/2024), Disp: 60 tablet, Rfl: 6    Objective:  /80 (BP Location: Left arm, Patient Position: Sitting, Cuff Size: Large)   Pulse 66   Ht 5' 1\" (1.549 m)   SpO2 98%   BMI 26.45 kg/m²      Labs  I have reviewed pertinent labs:  CBC:   Lab Results   Component Value Date    WBC 5.78 07/06/2023    RBC 4.89 07/06/2023    HGB 14.3 07/06/2023    HCT 41.7 07/06/2023    MCV 85 07/06/2023     07/06/2023    MCH 29.2 07/06/2023    MCHC 34.3 07/06/2023    RDW 12.2 07/06/2023    MPV 10.0 07/06/2023    NEUTROABS 3.68 07/06/2023     CMP:   Lab Results   Component Value Date    SODIUM 144 11/15/2024    K 3.8 11/15/2024     11/15/2024    CO2 31 11/15/2024    AGAP 8 11/15/2024    BUN 13 11/15/2024    CREATININE 0.84 11/15/2024    GLUC 84 11/15/2024    CALCIUM 9.5 11/15/2024    AST 15 11/15/2024    ALT 13 11/15/2024    ALKPHOS 55 11/15/2024    TP " 6.4 11/15/2024    ALB 4.2 11/15/2024    TBILI 0.7 11/15/2024    EGFR 79 11/15/2024     Thyroid Studies:   Lab Results   Component Value Date    PIQ1MFMPITSR 0.871 07/06/2023    FREET4 0.61 05/04/2023              General Exam  GENERAL APPEARANCE:  No distress, alert, interactive and cooperative.  CARDIOVASCULAR: Warm and well perfused  LUNGS: normal work of breathing on room air  EXTREMITIES: no peripheral edema     Neurologic Exam  MENTAL STATE:  Orientation was normal to time, place and person without aphasia or apraxia. Recent and remote memory was intact.  Attention span and concentration were normal. Language testing was normal for comprehension, repetition, expression, and naming.     CRANIAL NERVES:  CN 2       visual fields full to confrontation.  Visual acuity is 20/20 with hand-held chart  CN 3, 4, 6  Pupils round, 4 mm in diameter, equally reactive to light. Lids symmetric; no ptosis. EOMs normal alignment, full range. No nystagmus.  CN 5  Facial sensation intact bilaterally.  CN 7  Normal and symmetric facial strength.   CN 8  Hearing intact to finger rub bilaterally.  CN 9  Palate elevates symmetrically.  CN 11  Normal strength of shoulder shrug and neck turning.  CN 12  Tongue midline, with normal bulk and strength.     MOTOR:  Motor exam was normal. Muscle bulk and tone were normal in both upper and lower extremities. Muscle strength was 5/5 in distal and proximal muscles in both upper and lower extremities. No fasciculations, tremor or other abnormal movements were present.     REFLEXES:  RIGHT UE   LEFT UE  BR:2              BR:2    Biceps:2      Biceps:2    Triceps:2     Triceps:2       RIGHT LLE   LEFT LLE    Knee:2           Knee:2    Ankle:2         Ankle:2           COORDINATION:   Coordination exam was normal. In both upper extremities, finger-nose-finger was intact without dysmetria or overshoot.      GAIT:  Gait was normal. Station was stable with a normal base. Gait was stable with a normal  arm swing and speed.   Paraspinal muscle tenderness in the cervical    ROS:  Review of Systems   Constitutional:  Negative for appetite change, fatigue and fever.   HENT: Negative.  Negative for hearing loss, tinnitus, trouble swallowing and voice change.    Eyes: Negative.  Negative for photophobia, pain and visual disturbance.   Respiratory: Negative.  Negative for shortness of breath.    Cardiovascular: Negative.  Negative for palpitations.   Gastrointestinal: Negative.  Negative for nausea and vomiting.   Endocrine: Negative.  Negative for cold intolerance.   Genitourinary: Negative.  Negative for dysuria, frequency and urgency.   Musculoskeletal:  Positive for neck stiffness. Negative for back pain, gait problem, myalgias and neck pain.   Skin: Negative.  Negative for rash.   Allergic/Immunologic: Negative.    Neurological:  Positive for headaches. Negative for dizziness, tremors, seizures, syncope, facial asymmetry, speech difficulty, weakness, light-headedness and numbness.   Hematological: Negative.  Does not bruise/bleed easily.   Psychiatric/Behavioral: Negative.  Negative for confusion, hallucinations and sleep disturbance.    All other systems reviewed and are negative.      I have reviewed the past medical history, surgical history, social and family history, current medications, allergies vitals, review of systems, and updated this information as appropriate today.    Administrative Statements   The total amount of time spent with the patient and on chart review and documentation was  20 minutes. Issues addressed during this clinic visit included overall management, medication counseling or monitoring, and counseling and coordination of care.         Jacques Schmitt MD

## 2025-04-18 ENCOUNTER — TELEPHONE (OUTPATIENT)
Age: 60
End: 2025-04-18

## 2025-04-18 ENCOUNTER — TELEPHONE (OUTPATIENT)
Dept: NEUROLOGY | Facility: CLINIC | Age: 60
End: 2025-04-18

## 2025-04-18 DIAGNOSIS — G43.719 INTRACTABLE CHRONIC MIGRAINE WITHOUT AURA AND WITHOUT STATUS MIGRAINOSUS: Primary | ICD-10-CM

## 2025-04-18 NOTE — TELEPHONE ENCOUNTER
Reason for call:   [x] Prior Auth  [] Other:     Caller:  [] Patient  [x] Pharmacy  Name:   Address:   Callback Number:       Ordering Provider:   [] PCP/Provider -   [x] Speciality/Provider - [facility]     Has the patient tried other medications and failed? If failed, which medications did they fail?    [] No   [] Yes -     Is the patient's insurance updated in EPIC?   [] Yes   [] No     Is a copy of the patient's insurance scanned in EPIC?   [] Yes   [] No

## 2025-04-18 NOTE — TELEPHONE ENCOUNTER
Reason for call:   [x] Prior Auth  [] Other:     Caller:  [] Patient  [x] Pharmacy  Name:   Address:   Callback Number:       Ordering Provider:   [] PCP/Provider -   [x] Speciality/Provider - [facility]     Has the patient tried other medications and failed? If failed, which medications did they fail?    [] No   [] Yes -     Is the patient's insurance updated in EPIC?   [] Yes   [] No     Is a copy of the patient's insurance scanned in EPIC?   [] Yes   [] No

## 2025-04-18 NOTE — TELEPHONE ENCOUNTER
aretha from [facility] [place]  dept called regarding Qulipta PA that they received.  She just needed the medication directions.  Gave her medication directions.  Nothing further needed at this time

## 2025-04-18 NOTE — TELEPHONE ENCOUNTER
aretha from [facility] [place]  de[place] called regarding Qulipta PA that they received.  She just needed the medication directions.  Gave her medication directions.  Nothing further needed at this time

## 2025-04-21 ENCOUNTER — APPOINTMENT (OUTPATIENT)
Dept: LAB | Facility: CLINIC | Age: 60
End: 2025-04-21
Payer: COMMERCIAL

## 2025-04-21 DIAGNOSIS — G43.719 INTRACTABLE CHRONIC MIGRAINE WITHOUT AURA AND WITHOUT STATUS MIGRAINOSUS: ICD-10-CM

## 2025-04-21 LAB
CRP SERPL QL: 2.1 MG/L
ERYTHROCYTE [SEDIMENTATION RATE] IN BLOOD: 7 MM/HOUR (ref 0–29)

## 2025-04-21 PROCEDURE — 86140 C-REACTIVE PROTEIN: CPT

## 2025-04-21 PROCEDURE — 36415 COLL VENOUS BLD VENIPUNCTURE: CPT

## 2025-04-21 PROCEDURE — 85652 RBC SED RATE AUTOMATED: CPT

## 2025-04-21 PROCEDURE — 86618 LYME DISEASE ANTIBODY: CPT

## 2025-04-21 NOTE — TELEPHONE ENCOUNTER
Qulipta PA denied for the following 2 reasons:    Not trying 2 of the following: Emgality, Aimovig, Ajovy.  Not trying and failing Nurtec.      Please advise. Thank you

## 2025-04-21 NOTE — TELEPHONE ENCOUNTER
Ubrelvy PA denied for the following reasons:     Tried an failed 2 triptans (Pt has only tried Sumatriptan).    Please advise. Thank you.

## 2025-04-21 NOTE — TELEPHONE ENCOUNTER
[facility] Unfortunately medicare/medicaid patients do not qualify for coupons.    Please advise. Thank you.

## 2025-04-22 LAB — B BURGDOR IGG+IGM SER QL IA: NEGATIVE

## 2025-04-23 NOTE — TELEPHONE ENCOUNTER
Catherine CABALLERO submitte bekah M. Awaiting determination.    Taylor: [id number]    Jacques Schmitt MD to Me       4/21/25  2:55 PM  I can put patient on Ajovy if her insurance would allow let me know    Thank you

## 2025-04-24 NOTE — TELEPHONE ENCOUNTER
Checked on Catherine CABALLERO on [facility] and received the following notification:    - Outcome:  N/A on April 23 by [employer]. General. Closed by health plan.The submission did not include complete drug information. Please resubmit the request and include the drug name, strength, dose, directions, duration of therapy, and diagnosis.    Called [facility] as all of this information was entered into the PA form and spoke with Katherine. Katherine was having issues with her  being offline and was unable to look up any information at the time of the call  suggested that this writer call back later in the day to see if the issues have been resolved.    Resubmitted Catherine CABALLERO in [facility]. Awaiting determination.    Key: [id number]

## 2025-04-25 NOTE — TELEPHONE ENCOUNTER
Catherine CABALLERO approved from 4/24/25 to 10/24/25. Please see approval letter under Media tab.    Called pharmacy to make aware and spoke with Brenda, who stated that the claim went through with a copay of $3.    Called pt to make aware and left a detailed VM with a call back # if any further assistance is required.

## 2025-05-23 ENCOUNTER — TELEPHONE (OUTPATIENT)
Dept: NEUROLOGY | Facility: CLINIC | Age: 60
End: 2025-05-23

## 2025-05-23 DIAGNOSIS — G43.719 INTRACTABLE CHRONIC MIGRAINE WITHOUT AURA AND WITHOUT STATUS MIGRAINOSUS: Primary | ICD-10-CM

## 2025-05-23 RX ORDER — RIZATRIPTAN BENZOATE 10 MG/1
10 TABLET ORAL AS NEEDED
Qty: 9 TABLET | Refills: 5 | Status: SHIPPED | OUTPATIENT
Start: 2025-05-23 | End: 2025-11-19

## 2025-05-23 NOTE — TELEPHONE ENCOUNTER
Forwarding to PA team for review.         Patient called and states the pharmacy told her this needs a prior auth